# Patient Record
Sex: FEMALE | Race: WHITE | NOT HISPANIC OR LATINO | Employment: OTHER | URBAN - METROPOLITAN AREA
[De-identification: names, ages, dates, MRNs, and addresses within clinical notes are randomized per-mention and may not be internally consistent; named-entity substitution may affect disease eponyms.]

---

## 2019-02-25 ENCOUNTER — HISTORICAL (OUTPATIENT)
Dept: ADMINISTRATIVE | Facility: HOSPITAL | Age: 59
End: 2019-02-25

## 2019-03-03 LAB
FINAL CULTURE: NORMAL
FINAL CULTURE: NORMAL

## 2019-09-09 ENCOUNTER — HISTORICAL (OUTPATIENT)
Dept: ADMINISTRATIVE | Facility: HOSPITAL | Age: 59
End: 2019-09-09

## 2019-09-15 LAB — FINAL CULTURE: NORMAL

## 2022-11-14 ENCOUNTER — ANESTHESIA EVENT (OUTPATIENT)
Dept: CARDIOLOGY | Facility: HOSPITAL | Age: 62
End: 2022-11-14
Payer: MEDICARE

## 2022-11-15 ENCOUNTER — ANESTHESIA (OUTPATIENT)
Dept: CARDIOLOGY | Facility: HOSPITAL | Age: 62
End: 2022-11-15
Payer: MEDICARE

## 2022-11-15 ENCOUNTER — HOSPITAL ENCOUNTER (OUTPATIENT)
Dept: CARDIOLOGY | Facility: HOSPITAL | Age: 62
Discharge: HOME | End: 2022-11-15
Attending: INTERNAL MEDICINE
Payer: MEDICARE

## 2022-11-15 VITALS
HEIGHT: 68 IN | TEMPERATURE: 97 F | DIASTOLIC BLOOD PRESSURE: 70 MMHG | RESPIRATION RATE: 17 BRPM | WEIGHT: 293 LBS | SYSTOLIC BLOOD PRESSURE: 125 MMHG | OXYGEN SATURATION: 96 % | HEART RATE: 61 BPM | BODY MASS INDEX: 44.41 KG/M2

## 2022-11-15 DIAGNOSIS — I48.0 PAROXYSMAL ATRIAL FIBRILLATION: ICD-10-CM

## 2022-11-15 DIAGNOSIS — I48.0 PAROXYSMAL ATRIAL FIBRILLATION: Primary | ICD-10-CM

## 2022-11-15 DIAGNOSIS — I25.10 CAD (CORONARY ARTERY DISEASE): ICD-10-CM

## 2022-11-15 DIAGNOSIS — I48.19 PERSISTENT ATRIAL FIBRILLATION: ICD-10-CM

## 2022-11-15 LAB — BSA FOR ECHO PROCEDURE: 2.61 M2

## 2022-11-15 PROCEDURE — 93320 DOPPLER ECHO COMPLETE: CPT

## 2022-11-15 PROCEDURE — 93010 EKG 12-LEAD: ICD-10-PCS | Mod: ,,, | Performed by: INTERNAL MEDICINE

## 2022-11-15 PROCEDURE — 25000003 PHARM REV CODE 250: Performed by: NURSE ANESTHETIST, CERTIFIED REGISTERED

## 2022-11-15 PROCEDURE — 93005 ELECTROCARDIOGRAM TRACING: CPT | Mod: 59

## 2022-11-15 PROCEDURE — 37000009 HC ANESTHESIA EA ADD 15 MINS

## 2022-11-15 PROCEDURE — 93010 ELECTROCARDIOGRAM REPORT: CPT | Mod: ,,, | Performed by: INTERNAL MEDICINE

## 2022-11-15 PROCEDURE — 63600175 PHARM REV CODE 636 W HCPCS: Performed by: NURSE ANESTHETIST, CERTIFIED REGISTERED

## 2022-11-15 PROCEDURE — 37000008 HC ANESTHESIA 1ST 15 MINUTES

## 2022-11-15 RX ORDER — POTASSIUM CHLORIDE 1500 MG/1
20 TABLET, EXTENDED RELEASE ORAL 2 TIMES DAILY
COMMUNITY
Start: 2022-07-14

## 2022-11-15 RX ORDER — PROPOFOL 10 MG/ML
VIAL (ML) INTRAVENOUS
Status: DISCONTINUED | OUTPATIENT
Start: 2022-11-15 | End: 2022-11-15

## 2022-11-15 RX ORDER — FUROSEMIDE 80 MG/1
80 TABLET ORAL 2 TIMES DAILY PRN
COMMUNITY
Start: 2022-03-25

## 2022-11-15 RX ORDER — PAROXETINE HYDROCHLORIDE 20 MG/1
1 TABLET, FILM COATED ORAL DAILY
COMMUNITY
Start: 2022-03-25

## 2022-11-15 RX ORDER — ALBUTEROL SULFATE 0.83 MG/ML
2.5 SOLUTION RESPIRATORY (INHALATION) EVERY 4 HOURS PRN
COMMUNITY

## 2022-11-15 RX ORDER — LEVOTHYROXINE SODIUM 125 UG/1
125 TABLET ORAL DAILY
COMMUNITY
Start: 2022-09-28

## 2022-11-15 RX ORDER — FERROUS SULFATE 325(65) MG
325 TABLET, DELAYED RELEASE (ENTERIC COATED) ORAL DAILY
COMMUNITY
Start: 2022-04-07

## 2022-11-15 RX ORDER — HYDROCODONE BITARTRATE AND ACETAMINOPHEN 7.5; 325 MG/1; MG/1
1 TABLET ORAL 2 TIMES DAILY PRN
COMMUNITY
Start: 2022-08-02

## 2022-11-15 RX ORDER — LIDOCAINE HYDROCHLORIDE 20 MG/ML
INJECTION, SOLUTION EPIDURAL; INFILTRATION; INTRACAUDAL; PERINEURAL
Status: DISCONTINUED | OUTPATIENT
Start: 2022-11-15 | End: 2022-11-15

## 2022-11-15 RX ORDER — OMEPRAZOLE 40 MG/1
40 CAPSULE, DELAYED RELEASE ORAL
COMMUNITY
Start: 2022-07-15

## 2022-11-15 RX ADMIN — PROPOFOL 70 MG: 10 INJECTION, EMULSION INTRAVENOUS at 07:11

## 2022-11-15 RX ADMIN — PROPOFOL 50 MG: 10 INJECTION, EMULSION INTRAVENOUS at 07:11

## 2022-11-15 RX ADMIN — LIDOCAINE HYDROCHLORIDE 80 ML: 20 INJECTION, SOLUTION EPIDURAL; INFILTRATION; INTRACAUDAL; PERINEURAL at 07:11

## 2022-11-15 NOTE — ANESTHESIA POSTPROCEDURE EVALUATION
Anesthesia Post Evaluation    Patient: Azalea Marin    Procedure(s) Performed: * No procedures listed *    Final Anesthesia Type: general      Patient location during evaluation: PACU  Patient participation: Yes- Able to Participate  Level of consciousness: awake and alert  Post-procedure vital signs: reviewed and stable  Pain management: adequate  Airway patency: patent      Anesthetic complications: no      Cardiovascular status: hemodynamically stable  Respiratory status: unassisted  Hydration status: euvolemic  Follow-up not needed.          Vitals Value Taken Time   /74 11/15/22 0820   Temp 36 °C (96.8 °F) 11/15/22 0757   Pulse 75 11/15/22 0820   Resp 17 11/15/22 0820   SpO2 97 % 11/15/22 0820         No case tracking events are documented in the log.      Pain/Paul Score: No data recorded

## 2022-11-15 NOTE — DISCHARGE SUMMARY
[facility] - Cardiology Diagnostics  Discharge Note  Short Stay    Transesophageal echo (CHRIS)      OUTCOME: Patient tolerated treatment/procedure well without complication and is now ready for discharge.    DISPOSITION: Home or Self Care    FINAL DIAGNOSIS:  Persistent atrial fibrillation    FOLLOWUP: In clinic    DISCHARGE INSTRUCTIONS:    Discharge Procedure Orders   Diet Cardiac     Lifting restrictions   Order Comments: Do not raise arm above shoulder height and no more then 15 pounds.     Notify your health care provider if you experience any of the following:  temperature >100.4     Notify your health care provider if you experience any of the following:  redness, tenderness, or signs of infection (pain, swelling, redness, odor or green/yellow discharge around incision site)        TIME SPENT ON DISCHARGE: 15 minutes

## 2022-11-15 NOTE — TRANSFER OF CARE
"Anesthesia Transfer of Care Note    Patient: Azalea Marin    Procedure(s) Performed: * No procedures listed *    Patient location: PACU    Anesthesia Type: general    Transport from OR: Transported from OR on room air with adequate spontaneous ventilation    Post pain: adequate analgesia    Post assessment: no apparent anesthetic complications    Post vital signs: stable    Level of consciousness: awake    Nausea/Vomiting: no nausea/vomiting    Complications: none    Transfer of care protocol was followed      Last vitals:   Visit Vitals  /86 (BP Location: Left arm, Patient Position: Lying)   Pulse 68   Temp 36 °C (96.8 °F) (Skin)   Resp 14   Ht 5' 8" (1.727 m)   Wt (!) 141.5 kg (311 lb 15.2 oz)   SpO2 100%   Breastfeeding No   BMI 47.43 kg/m²     "

## 2022-11-15 NOTE — DISCHARGE INSTRUCTIONS
Follow up with Dr. Leyva 11/30/2022 @ 3:00pm [facility]

## 2022-12-08 ENCOUNTER — LAB VISIT (OUTPATIENT)
Dept: LAB | Facility: HOSPITAL | Age: 62
End: 2022-12-08
Attending: INTERNAL MEDICINE
Payer: MEDICARE

## 2022-12-08 DIAGNOSIS — Z00.6 EXAMINATION OF PARTICIPANT IN CLINICAL TRIAL: ICD-10-CM

## 2022-12-08 DIAGNOSIS — I48.20 CHRONIC ATRIAL FIBRILLATION: Primary | ICD-10-CM

## 2022-12-08 LAB
ABORH RETYPE: NORMAL
ALBUMIN SERPL-MCNC: 4.2 GM/DL (ref 3.4–4.8)
ALBUMIN/GLOB SERPL: 1.2 RATIO (ref 1.1–2)
ALP SERPL-CCNC: 209 UNIT/L (ref 40–150)
ALT SERPL-CCNC: 15 UNIT/L (ref 0–55)
AST SERPL-CCNC: 28 UNIT/L (ref 5–34)
BILIRUBIN DIRECT+TOT PNL SERPL-MCNC: 1.7 MG/DL
BUN SERPL-MCNC: 14.3 MG/DL (ref 9.8–20.1)
CALCIUM SERPL-MCNC: 10.3 MG/DL (ref 8.4–10.2)
CHLORIDE SERPL-SCNC: 71 MMOL/L (ref 98–107)
CO2 SERPL-SCNC: 47 MMOL/L (ref 23–31)
CREAT SERPL-MCNC: 0.73 MG/DL (ref 0.55–1.02)
ERYTHROCYTE [DISTWIDTH] IN BLOOD BY AUTOMATED COUNT: 13 % (ref 11.5–17)
GFR SERPLBLD CREATININE-BSD FMLA CKD-EPI: >60 MLS/MIN/1.73/M2
GLOBULIN SER-MCNC: 3.4 GM/DL (ref 2.4–3.5)
GLUCOSE SERPL-MCNC: 110 MG/DL (ref 82–115)
GROUP & RH: NORMAL
HCT VFR BLD AUTO: 36.2 % (ref 37–47)
HGB BLD-MCNC: 12 GM/DL (ref 12–16)
INDIRECT COOMBS GEL: NORMAL
INR BLD: 1.37 (ref 0–1.3)
MCH RBC QN AUTO: 34.6 PG (ref 27–31)
MCHC RBC AUTO-ENTMCNC: 33.1 MG/DL (ref 33–36)
MCV RBC AUTO: 104.3 FL (ref 80–94)
NRBC BLD AUTO-RTO: 0 %
PLATELET # BLD AUTO: 186 X10(3)/MCL (ref 130–400)
PMV BLD AUTO: 10 FL (ref 7.4–10.4)
POTASSIUM SERPL-SCNC: 3 MMOL/L (ref 3.5–5.1)
PROT SERPL-MCNC: 7.6 GM/DL (ref 5.8–7.6)
PROTHROMBIN TIME: 16.7 SECONDS (ref 12.5–14.5)
RBC # BLD AUTO: 3.47 X10(6)/MCL (ref 4.2–5.4)
SODIUM SERPL-SCNC: 132 MMOL/L (ref 136–145)
WBC # SPEC AUTO: 4.3 X10(3)/MCL (ref 4.5–11.5)

## 2022-12-08 PROCEDURE — 85610 PROTHROMBIN TIME: CPT

## 2022-12-08 PROCEDURE — 93005 ELECTROCARDIOGRAM TRACING: CPT

## 2022-12-08 PROCEDURE — 86901 BLOOD TYPING SEROLOGIC RH(D): CPT | Performed by: INTERNAL MEDICINE

## 2022-12-08 PROCEDURE — 93010 EKG 12-LEAD: ICD-10-PCS | Mod: ,,, | Performed by: INTERNAL MEDICINE

## 2022-12-08 PROCEDURE — 85027 COMPLETE CBC AUTOMATED: CPT

## 2022-12-08 PROCEDURE — 36415 COLL VENOUS BLD VENIPUNCTURE: CPT

## 2022-12-08 PROCEDURE — 80053 COMPREHEN METABOLIC PANEL: CPT

## 2022-12-08 PROCEDURE — 93010 ELECTROCARDIOGRAM REPORT: CPT | Mod: ,,, | Performed by: INTERNAL MEDICINE

## 2022-12-12 ENCOUNTER — ANESTHESIA EVENT (OUTPATIENT)
Dept: CARDIOLOGY | Facility: HOSPITAL | Age: 62
DRG: 274 | End: 2022-12-12
Payer: MEDICARE

## 2022-12-12 RX ORDER — ENOXAPARIN SODIUM 150 MG/ML
150 INJECTION SUBCUTANEOUS 2 TIMES DAILY
Status: ON HOLD | COMMUNITY
Start: 2022-11-30 | End: 2022-12-13

## 2022-12-13 ENCOUNTER — ANESTHESIA (OUTPATIENT)
Dept: CARDIOLOGY | Facility: HOSPITAL | Age: 62
DRG: 274 | End: 2022-12-13
Payer: MEDICARE

## 2022-12-13 ENCOUNTER — HOSPITAL ENCOUNTER (OUTPATIENT)
Dept: CARDIOLOGY | Facility: HOSPITAL | Age: 62
Discharge: HOME | DRG: 274 | End: 2022-12-13
Attending: INTERNAL MEDICINE
Payer: MEDICARE

## 2022-12-13 ENCOUNTER — HOSPITAL ENCOUNTER (INPATIENT)
Facility: HOSPITAL | Age: 62
LOS: 1 days | Discharge: HOME | DRG: 274 | End: 2022-12-14
Attending: INTERNAL MEDICINE | Admitting: INTERNAL MEDICINE
Payer: MEDICARE

## 2022-12-13 DIAGNOSIS — I48.20 CHRONIC ATRIAL FIBRILLATION: ICD-10-CM

## 2022-12-13 DIAGNOSIS — I48.20 CHRONIC ATRIAL FIBRILLATION: Primary | ICD-10-CM

## 2022-12-13 DIAGNOSIS — I48.91 ATRIAL FIBRILLATION: ICD-10-CM

## 2022-12-13 LAB
ANION GAP SERPL CALC-SCNC: 7 MEQ/L
BSA FOR ECHO PROCEDURE: 2.52 M2
BUN SERPL-MCNC: 9.3 MG/DL (ref 9.8–20.1)
CALCIUM SERPL-MCNC: 9.5 MG/DL (ref 8.4–10.2)
CHLORIDE SERPL-SCNC: 82 MMOL/L (ref 98–107)
CO2 SERPL-SCNC: 40 MMOL/L (ref 23–31)
CREAT SERPL-MCNC: 0.66 MG/DL (ref 0.55–1.02)
CREAT/UREA NIT SERPL: 14
GFR SERPLBLD CREATININE-BSD FMLA CKD-EPI: >60 MLS/MIN/1.73/M2
GLUCOSE SERPL-MCNC: 118 MG/DL (ref 82–115)
INR BLD: 1.05 (ref 0–1.3)
POCT GLUCOSE: 104 MG/DL (ref 70–110)
POTASSIUM SERPL-SCNC: 3.6 MMOL/L (ref 3.5–5.1)
PROTHROMBIN TIME: 13.6 SECONDS (ref 12.5–14.5)
SODIUM SERPL-SCNC: 129 MMOL/L (ref 136–145)

## 2022-12-13 PROCEDURE — C1889 IMPLANT/INSERT DEVICE, NOC: HCPCS | Performed by: INTERNAL MEDICINE

## 2022-12-13 PROCEDURE — 25000003 PHARM REV CODE 250

## 2022-12-13 PROCEDURE — 11000001 HC ACUTE MED/SURG PRIVATE ROOM

## 2022-12-13 PROCEDURE — 25000003 PHARM REV CODE 250: Performed by: INTERNAL MEDICINE

## 2022-12-13 PROCEDURE — 80048 BASIC METABOLIC PNL TOTAL CA: CPT | Performed by: INTERNAL MEDICINE

## 2022-12-13 PROCEDURE — 85610 PROTHROMBIN TIME: CPT | Performed by: INTERNAL MEDICINE

## 2022-12-13 PROCEDURE — 36415 COLL VENOUS BLD VENIPUNCTURE: CPT | Performed by: INTERNAL MEDICINE

## 2022-12-13 PROCEDURE — 37000008 HC ANESTHESIA 1ST 15 MINUTES: Performed by: INTERNAL MEDICINE

## 2022-12-13 PROCEDURE — 25500020 PHARM REV CODE 255: Performed by: INTERNAL MEDICINE

## 2022-12-13 PROCEDURE — 33340 PERQ CLSR TCAT L ATR APNDGE: CPT | Performed by: INTERNAL MEDICINE

## 2022-12-13 PROCEDURE — 93312 ECHO TRANSESOPHAGEAL: CPT

## 2022-12-13 PROCEDURE — 63600175 PHARM REV CODE 636 W HCPCS

## 2022-12-13 PROCEDURE — 63600175 PHARM REV CODE 636 W HCPCS: Performed by: ANESTHESIOLOGY

## 2022-12-13 PROCEDURE — 27201423 OPTIME MED/SURG SUP & DEVICES STERILE SUPPLY: Performed by: INTERNAL MEDICINE

## 2022-12-13 PROCEDURE — 93005 ELECTROCARDIOGRAM TRACING: CPT

## 2022-12-13 PROCEDURE — 93010 EKG 12-LEAD: ICD-10-PCS | Mod: ,,, | Performed by: INTERNAL MEDICINE

## 2022-12-13 PROCEDURE — C1894 INTRO/SHEATH, NON-LASER: HCPCS | Performed by: INTERNAL MEDICINE

## 2022-12-13 PROCEDURE — 63600175 PHARM REV CODE 636 W HCPCS: Performed by: NURSE PRACTITIONER

## 2022-12-13 PROCEDURE — 37000009 HC ANESTHESIA EA ADD 15 MINS: Performed by: INTERNAL MEDICINE

## 2022-12-13 PROCEDURE — 93010 ELECTROCARDIOGRAM REPORT: CPT | Mod: ,,, | Performed by: INTERNAL MEDICINE

## 2022-12-13 DEVICE — LEFT ATRIAL APPENDAGE CLOSURE DEVICE WITH DELIVERY SYSTEM
Type: IMPLANTABLE DEVICE | Site: HEART | Status: FUNCTIONAL
Brand: WATCHMAN FLX™

## 2022-12-13 RX ORDER — ALBUTEROL SULFATE 0.83 MG/ML
2.5 SOLUTION RESPIRATORY (INHALATION) EVERY 4 HOURS PRN
Status: DISCONTINUED | OUTPATIENT
Start: 2022-12-13 | End: 2022-12-14 | Stop reason: HOSPADM

## 2022-12-13 RX ORDER — PROPOFOL 10 MG/ML
VIAL (ML) INTRAVENOUS
Status: DISCONTINUED | OUTPATIENT
Start: 2022-12-13 | End: 2022-12-13

## 2022-12-13 RX ORDER — LIDOCAINE HYDROCHLORIDE 20 MG/ML
INJECTION, SOLUTION EPIDURAL; INFILTRATION; INTRACAUDAL; PERINEURAL
Status: DISCONTINUED | OUTPATIENT
Start: 2022-12-13 | End: 2022-12-13

## 2022-12-13 RX ORDER — FUROSEMIDE 40 MG/1
80 TABLET ORAL 2 TIMES DAILY PRN
Status: DISCONTINUED | OUTPATIENT
Start: 2022-12-13 | End: 2022-12-14 | Stop reason: HOSPADM

## 2022-12-13 RX ORDER — FENTANYL CITRATE 50 UG/ML
INJECTION, SOLUTION INTRAMUSCULAR; INTRAVENOUS
Status: DISCONTINUED | OUTPATIENT
Start: 2022-12-13 | End: 2022-12-13

## 2022-12-13 RX ORDER — POTASSIUM CHLORIDE 20 MEQ/1
20 TABLET, EXTENDED RELEASE ORAL 2 TIMES DAILY
Status: DISCONTINUED | OUTPATIENT
Start: 2022-12-13 | End: 2022-12-14 | Stop reason: HOSPADM

## 2022-12-13 RX ORDER — HYDROCODONE BITARTRATE AND ACETAMINOPHEN 7.5; 325 MG/1; MG/1
1 TABLET ORAL EVERY 6 HOURS PRN
Status: DISCONTINUED | OUTPATIENT
Start: 2022-12-13 | End: 2022-12-14 | Stop reason: HOSPADM

## 2022-12-13 RX ORDER — DOXYCYCLINE HYCLATE 100 MG
100 TABLET ORAL 2 TIMES DAILY
Status: DISCONTINUED | OUTPATIENT
Start: 2022-12-13 | End: 2022-12-14 | Stop reason: HOSPADM

## 2022-12-13 RX ORDER — CEFAZOLIN SODIUM 1 G/3ML
INJECTION, POWDER, FOR SOLUTION INTRAMUSCULAR; INTRAVENOUS
Status: DISCONTINUED | OUTPATIENT
Start: 2022-12-13 | End: 2022-12-13

## 2022-12-13 RX ORDER — CEFAZOLIN SODIUM 2 G/50ML
2 SOLUTION INTRAVENOUS ONCE
Status: DISCONTINUED | OUTPATIENT
Start: 2022-12-13 | End: 2022-12-14 | Stop reason: HOSPADM

## 2022-12-13 RX ORDER — HYDROCODONE BITARTRATE AND ACETAMINOPHEN 5; 325 MG/1; MG/1
1 TABLET ORAL EVERY 4 HOURS PRN
Status: DISCONTINUED | OUTPATIENT
Start: 2022-12-13 | End: 2022-12-14 | Stop reason: HOSPADM

## 2022-12-13 RX ORDER — HYDROMORPHONE HYDROCHLORIDE 2 MG/ML
0.2 INJECTION, SOLUTION INTRAMUSCULAR; INTRAVENOUS; SUBCUTANEOUS EVERY 5 MIN PRN
Status: DISCONTINUED | OUTPATIENT
Start: 2022-12-13 | End: 2022-12-14 | Stop reason: HOSPADM

## 2022-12-13 RX ORDER — ONDANSETRON 2 MG/ML
INJECTION INTRAMUSCULAR; INTRAVENOUS
Status: DISCONTINUED | OUTPATIENT
Start: 2022-12-13 | End: 2022-12-13

## 2022-12-13 RX ORDER — DEXAMETHASONE SODIUM PHOSPHATE 4 MG/ML
INJECTION, SOLUTION INTRA-ARTICULAR; INTRALESIONAL; INTRAMUSCULAR; INTRAVENOUS; SOFT TISSUE
Status: DISCONTINUED | OUTPATIENT
Start: 2022-12-13 | End: 2022-12-13

## 2022-12-13 RX ORDER — HYDROMORPHONE HYDROCHLORIDE 2 MG/ML
0.5 INJECTION, SOLUTION INTRAMUSCULAR; INTRAVENOUS; SUBCUTANEOUS EVERY 5 MIN PRN
Status: DISCONTINUED | OUTPATIENT
Start: 2022-12-13 | End: 2022-12-14 | Stop reason: HOSPADM

## 2022-12-13 RX ORDER — SUCCINYLCHOLINE CHLORIDE 20 MG/ML
INJECTION INTRAMUSCULAR; INTRAVENOUS
Status: DISCONTINUED | OUTPATIENT
Start: 2022-12-13 | End: 2022-12-13

## 2022-12-13 RX ORDER — LIDOCAINE HYDROCHLORIDE 10 MG/ML
INJECTION, SOLUTION EPIDURAL; INFILTRATION; INTRACAUDAL; PERINEURAL
Status: DISCONTINUED | OUTPATIENT
Start: 2022-12-13 | End: 2022-12-13

## 2022-12-13 RX ORDER — MEPERIDINE HYDROCHLORIDE 25 MG/ML
12.5 INJECTION INTRAMUSCULAR; INTRAVENOUS; SUBCUTANEOUS ONCE
Status: DISCONTINUED | OUTPATIENT
Start: 2022-12-13 | End: 2022-12-14 | Stop reason: HOSPADM

## 2022-12-13 RX ORDER — MUPIROCIN 20 MG/G
OINTMENT TOPICAL DAILY
Status: DISCONTINUED | OUTPATIENT
Start: 2022-12-13 | End: 2022-12-13

## 2022-12-13 RX ORDER — ONDANSETRON 2 MG/ML
4 INJECTION INTRAMUSCULAR; INTRAVENOUS EVERY 8 HOURS PRN
Status: DISCONTINUED | OUTPATIENT
Start: 2022-12-13 | End: 2022-12-14 | Stop reason: HOSPADM

## 2022-12-13 RX ORDER — HEPARIN SODIUM 1000 [USP'U]/ML
INJECTION, SOLUTION INTRAVENOUS; SUBCUTANEOUS CONTINUOUS PRN
Status: DISCONTINUED | OUTPATIENT
Start: 2022-12-13 | End: 2022-12-13

## 2022-12-13 RX ORDER — DIPHENHYDRAMINE HYDROCHLORIDE 50 MG/ML
25 INJECTION INTRAMUSCULAR; INTRAVENOUS EVERY 6 HOURS PRN
Status: DISCONTINUED | OUTPATIENT
Start: 2022-12-13 | End: 2022-12-14 | Stop reason: HOSPADM

## 2022-12-13 RX ORDER — ONDANSETRON 2 MG/ML
4 INJECTION INTRAMUSCULAR; INTRAVENOUS ONCE
Status: DISCONTINUED | OUTPATIENT
Start: 2022-12-13 | End: 2022-12-14 | Stop reason: HOSPADM

## 2022-12-13 RX ORDER — PANTOPRAZOLE SODIUM 40 MG/1
40 TABLET, DELAYED RELEASE ORAL DAILY
Status: DISCONTINUED | OUTPATIENT
Start: 2022-12-13 | End: 2022-12-14 | Stop reason: HOSPADM

## 2022-12-13 RX ORDER — PAROXETINE HYDROCHLORIDE 20 MG/1
20 TABLET, FILM COATED ORAL DAILY
Status: DISCONTINUED | OUTPATIENT
Start: 2022-12-13 | End: 2022-12-14 | Stop reason: HOSPADM

## 2022-12-13 RX ORDER — ROCURONIUM BROMIDE 10 MG/ML
INJECTION, SOLUTION INTRAVENOUS
Status: DISCONTINUED | OUTPATIENT
Start: 2022-12-13 | End: 2022-12-13

## 2022-12-13 RX ORDER — LEVOTHYROXINE SODIUM 125 UG/1
125 TABLET ORAL DAILY
Status: DISCONTINUED | OUTPATIENT
Start: 2022-12-13 | End: 2022-12-14 | Stop reason: HOSPADM

## 2022-12-13 RX ORDER — HEPARIN SODIUM 1000 [USP'U]/ML
INJECTION, SOLUTION INTRAVENOUS; SUBCUTANEOUS
Status: DISCONTINUED | OUTPATIENT
Start: 2022-12-13 | End: 2022-12-13

## 2022-12-13 RX ORDER — METOPROLOL SUCCINATE 25 MG/1
25 TABLET, EXTENDED RELEASE ORAL DAILY
Status: DISCONTINUED | OUTPATIENT
Start: 2022-12-13 | End: 2022-12-14 | Stop reason: HOSPADM

## 2022-12-13 RX ADMIN — ONDANSETRON 4 MG: 2 INJECTION INTRAMUSCULAR; INTRAVENOUS at 03:12

## 2022-12-13 RX ADMIN — FENTANYL CITRATE 50 MCG: 50 INJECTION, SOLUTION INTRAMUSCULAR; INTRAVENOUS at 09:12

## 2022-12-13 RX ADMIN — SUGAMMADEX 200 MG: 100 INJECTION, SOLUTION INTRAVENOUS at 10:12

## 2022-12-13 RX ADMIN — PROPOFOL 140 MG: 10 INJECTION, EMULSION INTRAVENOUS at 09:12

## 2022-12-13 RX ADMIN — ROCURONIUM BROMIDE 5 MG: 10 INJECTION, SOLUTION INTRAVENOUS at 09:12

## 2022-12-13 RX ADMIN — ROCURONIUM BROMIDE 45 MG: 10 INJECTION, SOLUTION INTRAVENOUS at 10:12

## 2022-12-13 RX ADMIN — LIDOCAINE HYDROCHLORIDE 80 MG: 20 INJECTION, SOLUTION EPIDURAL; INFILTRATION; INTRACAUDAL; PERINEURAL at 09:12

## 2022-12-13 RX ADMIN — HYDROMORPHONE HYDROCHLORIDE 0.5 MG: 2 INJECTION INTRAMUSCULAR; INTRAVENOUS; SUBCUTANEOUS at 11:12

## 2022-12-13 RX ADMIN — POTASSIUM CHLORIDE 20 MEQ: 20 TABLET, EXTENDED RELEASE ORAL at 06:12

## 2022-12-13 RX ADMIN — HEPARIN SODIUM 6000 UNITS: 1000 INJECTION, SOLUTION INTRAVENOUS; SUBCUTANEOUS at 10:12

## 2022-12-13 RX ADMIN — ROCURONIUM BROMIDE 20 MG: 10 INJECTION, SOLUTION INTRAVENOUS at 10:12

## 2022-12-13 RX ADMIN — PROPOFOL 30 MG: 10 INJECTION, EMULSION INTRAVENOUS at 10:12

## 2022-12-13 RX ADMIN — SODIUM CHLORIDE, SODIUM GLUCONATE, SODIUM ACETATE, POTASSIUM CHLORIDE AND MAGNESIUM CHLORIDE: 526; 502; 368; 37; 30 INJECTION, SOLUTION INTRAVENOUS at 09:12

## 2022-12-13 RX ADMIN — PANTOPRAZOLE SODIUM 40 MG: 40 TABLET, DELAYED RELEASE ORAL at 06:12

## 2022-12-13 RX ADMIN — DEXAMETHASONE SODIUM PHOSPHATE 4 MG: 4 INJECTION, SOLUTION INTRA-ARTICULAR; INTRALESIONAL; INTRAMUSCULAR; INTRAVENOUS; SOFT TISSUE at 09:12

## 2022-12-13 RX ADMIN — SUCCINYLCHOLINE CHLORIDE 200 MG: 20 INJECTION, SOLUTION INTRAMUSCULAR; INTRAVENOUS at 09:12

## 2022-12-13 RX ADMIN — DOXYCYCLINE HYCLATE 100 MG: 100 TABLET, COATED ORAL at 09:12

## 2022-12-13 RX ADMIN — ONDANSETRON 4 MG: 2 INJECTION INTRAMUSCULAR; INTRAVENOUS at 10:12

## 2022-12-13 RX ADMIN — HEPARIN SODIUM 7000 UNITS: 1000 INJECTION, SOLUTION INTRAVENOUS; SUBCUTANEOUS at 10:12

## 2022-12-13 RX ADMIN — MUPIROCIN: 20 OINTMENT TOPICAL at 09:12

## 2022-12-13 RX ADMIN — HEPARIN SODIUM 1000 UNITS/HR: 1000 INJECTION, SOLUTION INTRAVENOUS; SUBCUTANEOUS at 10:12

## 2022-12-13 RX ADMIN — HYDROMORPHONE HYDROCHLORIDE 0.5 MG: 2 INJECTION INTRAMUSCULAR; INTRAVENOUS; SUBCUTANEOUS at 12:12

## 2022-12-13 RX ADMIN — CEFAZOLIN 2 G: 330 INJECTION, POWDER, FOR SOLUTION INTRAMUSCULAR; INTRAVENOUS at 09:12

## 2022-12-13 RX ADMIN — FENTANYL CITRATE 50 MCG: 50 INJECTION, SOLUTION INTRAMUSCULAR; INTRAVENOUS at 10:12

## 2022-12-13 RX ADMIN — HYDROMORPHONE HYDROCHLORIDE 0.5 MG: 2 INJECTION INTRAMUSCULAR; INTRAVENOUS; SUBCUTANEOUS at 01:12

## 2022-12-13 NOTE — TRANSFER OF CARE
"Anesthesia Transfer of Care Note    Patient: Azalea Marin    Procedure(s) Performed: Procedure(s) (LRB):  CLOSURE, LEFT ATRIAL APPENDAGE, USING DEVICE (N/A)  ECHOCARDIOGRAM,TRANSESOPHAGEAL (N/A)    Patient location: PACU    Anesthesia Type: general    Transport from OR: Transported from OR on 2-3 L/min O2 by NC with adequate spontaneous ventilation    Post pain: adequate analgesia    Post assessment: no apparent anesthetic complications    Post vital signs: stable    Level of consciousness: responds to stimulation    Nausea/Vomiting: no nausea/vomiting    Complications: none    Transfer of care protocol was followedComments: Detailed report with handoff to licensed provider complete      Last vitals:   Visit Vitals  /72   Pulse 69   Temp 36.9 °C (98.4 °F)   Resp 18   Ht 5' 8" (1.727 m)   Wt 132.4 kg (291 lb 14.2 oz)   SpO2 99%   Breastfeeding No   BMI 44.38 kg/m²     "

## 2022-12-13 NOTE — ANESTHESIA PROCEDURE NOTES
Arterial    Diagnosis: IABP monitoring and/or frequent blood draws    Patient location during procedure: done in OR    Staffing  Authorizing Provider: Kristin Foley MD  Performing Provider: Kristin Foley MD    Anesthesiologist was present at the time of the procedure.    Preanesthetic Checklist  Completed: patient identified, IV checked, site marked, risks and benefits discussed, surgical consent, monitors and equipment checked, pre-op evaluation, timeout performed and anesthesia consent givenArterial  Skin Prep: chlorhexidine gluconate  Local Infiltration: lidocaine  Orientation: right  Location: radial    Catheter Size: 20 G  Catheter placement by Anatomical landmarks. Heme positive aspiration all ports. Insertion Attempts: 1  Assessment  Dressing: secured with tape and tegaderm  Patient: Tolerated well

## 2022-12-13 NOTE — Clinical Note
A dressing was applied to the right femoral vein puncture site. Sterile dressing and tegaderm applied.

## 2022-12-13 NOTE — ANESTHESIA PREPROCEDURE EVALUATION
12/13/2022  Azalea Marin is a 62 y.o., female.    EF 55-60%, PFO, mod MR/TR    Pre-op Assessment    I have reviewed the Patient Summary Reports.     I have reviewed the Nursing Notes. I have reviewed the NPO Status.   I have reviewed the Medications.     Review of Systems  Social:  Former Smoker    Cardiovascular:   Hypertension CHF NYHA Classification II    Pulmonary:   COPD Sleep Apnea    Hepatic/GI:   GERD    Endocrine:  Morbid Obesity / BMI > 40  Psych:   Psychiatric History          Physical Exam  General: Well nourished, Cooperative, Alert and Oriented    Airway:  Mallampati: II   Mouth Opening: Normal  TM Distance: Normal  Tongue: Normal  Neck ROM: Normal ROM    Dental:Upper edentulous      Anesthesia Plan  Type of Anesthesia, risks & benefits discussed:    Anesthesia Type: Gen ETT  Intra-op Monitoring Plan: Standard ASA Monitors and Art Line  Post Op Pain Control Plan: multimodal analgesia and IV/PO Opioids PRN  Airway Plan: Direct, Post-Induction  Informed Consent: Informed consent signed with the Patient and all parties understand the risks and agree with anesthesia plan.  All questions answered. Patient consented to blood products? Yes  ASA Score: 3  Day of Surgery Review of History & Physical: H&P Update referred to the surgeon/provider.    Ready For Surgery From Anesthesia Perspective.     .

## 2022-12-13 NOTE — ANESTHESIA POSTPROCEDURE EVALUATION
Anesthesia Post Evaluation    Patient: Azalea Marin    Procedure(s) Performed: Procedure(s) (LRB):  CLOSURE, LEFT ATRIAL APPENDAGE, USING DEVICE (N/A)  ECHOCARDIOGRAM,TRANSESOPHAGEAL (N/A)    Final Anesthesia Type: general      Patient location during evaluation: PACU  Patient participation: Yes- Able to Participate  Level of consciousness: awake and alert  Post-procedure vital signs: reviewed and stable  Pain management: adequate  Airway patency: patent      Anesthetic complications: no      Cardiovascular status: hemodynamically stable  Respiratory status: unassisted  Hydration status: euvolemic  Follow-up not needed.          Vitals Value Taken Time   /76 12/13/22 1330   Temp 36.9 °C (98.5 °F) 12/13/22 1112   Pulse 70 12/13/22 1339   Resp 18 12/13/22 1339   SpO2 96 % 12/13/22 1339   Vitals shown include unvalidated device data.      No case tracking events are documented in the log.      Pain/Paul Score: Pain Rating Prior to Med Admin: 7 (12/13/2022  1:00 PM)  Pain Rating Post Med Admin: 2 (12/13/2022 11:40 AM)  Paul Score: 9 (12/13/2022 12:30 PM)

## 2022-12-13 NOTE — Clinical Note
A venogram was performed in the BRE. The vessel was injected via hand injection  with 20 mL of contrast. Venogram of BRE for sizing and morphology.

## 2022-12-13 NOTE — Clinical Note
Watchman device deployed under flouroscopy with CHRIS guidance and Watchman Rep verification of placement.

## 2022-12-13 NOTE — NURSING
Nurses Note -- 4 Eyes      12/13/2022   2:23 PM      Skin assessed during: Transfer      [] No Pressure Injuries Present    []Prevention Measures Documented      [x] Yes- Altered Skin Integrity Present or Discovered   [x] LDA Added if Not in Epic (Describe Wound)   [x] New Altered Skin Integrity was Present on Admit and Documented in LDA   [x] Wound Image Taken    Wound Care Consulted? Yes    Attending Nurse:  Rhona Machuca RN     Second RN/Staff Member: Gila Ferrell CNA

## 2022-12-13 NOTE — ANESTHESIA PROCEDURE NOTES
Intubation    Date/Time: 12/13/2022 9:53 AM  Performed by: Nabil Chauhan  Authorized by: Kristin Foley MD     Intubation:     Induction:  Intravenous    Intubated:  Postinduction    Mask Ventilation:  Easy mask    Attempts:  1    Attempted By:  Student    Method of Intubation:  Direct    Blade:  Jacinto 4    Laryngeal View Grade: Grade I - full view of cords      Difficult Airway Encountered?: No      Complications:  None    Airway Device:  Oral endotracheal tube    Airway Device Size:  7.5    Style/Cuff Inflation:  Cuffed (inflated to minimal occlusive pressure)    Tube secured:  23    Secured at:  The teeth    Placement Verified By:  Capnometry    Complicating Factors:  None    Findings Post-Intubation:  BS equal bilateral and atraumatic/condition of teeth unchanged

## 2022-12-14 VITALS
SYSTOLIC BLOOD PRESSURE: 101 MMHG | HEIGHT: 68 IN | BODY MASS INDEX: 44.23 KG/M2 | DIASTOLIC BLOOD PRESSURE: 62 MMHG | WEIGHT: 291.88 LBS | TEMPERATURE: 98 F | HEART RATE: 73 BPM | RESPIRATION RATE: 18 BRPM | OXYGEN SATURATION: 100 %

## 2022-12-14 LAB
ANION GAP SERPL CALC-SCNC: 8 MEQ/L
AV INDEX (PROSTH): 0.82
AV MEAN GRADIENT: 6 MMHG
AV PEAK GRADIENT: 11 MMHG
AV VALVE AREA: 2.59 CM2
AV VELOCITY RATIO: 0.67
BASOPHILS # BLD AUTO: 0.02 X10(3)/MCL (ref 0–0.2)
BASOPHILS NFR BLD AUTO: 0.4 %
BSA FOR ECHO PROCEDURE: 2.52 M2
BUN SERPL-MCNC: 6.8 MG/DL (ref 9.8–20.1)
CALCIUM SERPL-MCNC: 9.5 MG/DL (ref 8.4–10.2)
CHLORIDE SERPL-SCNC: 86 MMOL/L (ref 98–107)
CO2 SERPL-SCNC: 37 MMOL/L (ref 23–31)
CREAT SERPL-MCNC: 0.63 MG/DL (ref 0.55–1.02)
CREAT/UREA NIT SERPL: 11
DOP CALC AO PEAK VEL: 1.65 M/S
DOP CALC AO VTI: 27.8 CM
DOP CALC LVOT AREA: 3.1 CM2
DOP CALC LVOT DIAMETER: 2 CM
DOP CALC LVOT PEAK VEL: 1.1 M/S
DOP CALC LVOT STROKE VOLUME: 71.91 CM3
DOP CALCLVOT PEAK VEL VTI: 22.9 CM
ECHO LV POSTERIOR WALL: 1.14 CM (ref 0.6–1.1)
EOSINOPHIL # BLD AUTO: 0.01 X10(3)/MCL (ref 0–0.9)
EOSINOPHIL NFR BLD AUTO: 0.2 %
ERYTHROCYTE [DISTWIDTH] IN BLOOD BY AUTOMATED COUNT: 12.2 % (ref 11.5–17)
GFR SERPLBLD CREATININE-BSD FMLA CKD-EPI: >60 MLS/MIN/1.73/M2
GLUCOSE SERPL-MCNC: 116 MG/DL (ref 82–115)
HCT VFR BLD AUTO: 33.7 % (ref 37–47)
HGB BLD-MCNC: 10.9 GM/DL (ref 12–16)
IMM GRANULOCYTES # BLD AUTO: 0.02 X10(3)/MCL (ref 0–0.04)
IMM GRANULOCYTES NFR BLD AUTO: 0.4 %
INTERVENTRICULAR SEPTUM: 1.03 CM (ref 0.6–1.1)
LEFT ATRIUM SIZE: 6.4 CM
LEFT INTERNAL DIMENSION IN SYSTOLE: 4.59 CM (ref 2.1–4)
LEFT VENTRICLE DIASTOLIC VOLUME INDEX: 75.83 ML/M2
LEFT VENTRICLE DIASTOLIC VOLUME: 182 ML
LEFT VENTRICLE SYSTOLIC VOLUME INDEX: 40.3 ML/M2
LEFT VENTRICLE SYSTOLIC VOLUME: 96.8 ML
LVOT MG: 2 MMHG
LVOT MV: 0.72 CM/S
LYMPHOCYTES # BLD AUTO: 1.01 X10(3)/MCL (ref 0.6–4.6)
LYMPHOCYTES NFR BLD AUTO: 20.9 %
MACROCYTES BLD QL SMEAR: ABNORMAL
MCH RBC QN AUTO: 34.4 PG (ref 27–31)
MCHC RBC AUTO-ENTMCNC: 32.3 MG/DL (ref 33–36)
MCV RBC AUTO: 106.3 FL (ref 80–94)
MONOCYTES # BLD AUTO: 0.71 X10(3)/MCL (ref 0.1–1.3)
MONOCYTES NFR BLD AUTO: 14.7 %
NEUTROPHILS # BLD AUTO: 3.1 X10(3)/MCL (ref 2.1–9.2)
NEUTROPHILS NFR BLD AUTO: 63.4 %
NRBC BLD AUTO-RTO: 0 %
PISA TR MAX VEL: 4.49 M/S
PLATELET # BLD AUTO: 146 X10(3)/MCL (ref 130–400)
PLATELET # BLD EST: ADEQUATE 10*3/UL
PMV BLD AUTO: 10.1 FL (ref 7.4–10.4)
POIKILOCYTOSIS BLD QL SMEAR: ABNORMAL
POTASSIUM SERPL-SCNC: 4.1 MMOL/L (ref 3.5–5.1)
RBC # BLD AUTO: 3.17 X10(6)/MCL (ref 4.2–5.4)
RBC MORPH BLD: ABNORMAL
RIGHT VENTRICULAR END-DIASTOLIC DIMENSION: 4.58 CM
SODIUM SERPL-SCNC: 131 MMOL/L (ref 136–145)
TARGETS BLD QL SMEAR: ABNORMAL
TR MAX PG: 81 MMHG
WBC # SPEC AUTO: 4.8 X10(3)/MCL (ref 4.5–11.5)

## 2022-12-14 PROCEDURE — 85025 COMPLETE CBC W/AUTO DIFF WBC: CPT | Performed by: INTERNAL MEDICINE

## 2022-12-14 PROCEDURE — 36415 COLL VENOUS BLD VENIPUNCTURE: CPT | Performed by: INTERNAL MEDICINE

## 2022-12-14 PROCEDURE — 80048 BASIC METABOLIC PNL TOTAL CA: CPT | Performed by: INTERNAL MEDICINE

## 2022-12-14 PROCEDURE — 25000003 PHARM REV CODE 250: Performed by: INTERNAL MEDICINE

## 2022-12-14 PROCEDURE — 25000003 PHARM REV CODE 250: Performed by: NURSE PRACTITIONER

## 2022-12-14 RX ADMIN — PANTOPRAZOLE SODIUM 40 MG: 40 TABLET, DELAYED RELEASE ORAL at 08:12

## 2022-12-14 RX ADMIN — DOXYCYCLINE HYCLATE 100 MG: 100 TABLET, COATED ORAL at 08:12

## 2022-12-14 RX ADMIN — POTASSIUM CHLORIDE 20 MEQ: 20 TABLET, EXTENDED RELEASE ORAL at 08:12

## 2022-12-14 RX ADMIN — METOPROLOL SUCCINATE 25 MG: 25 TABLET, FILM COATED, EXTENDED RELEASE ORAL at 08:12

## 2022-12-14 RX ADMIN — APIXABAN 5 MG: 5 TABLET, FILM COATED ORAL at 08:12

## 2022-12-14 RX ADMIN — LEVOTHYROXINE SODIUM 125 MCG: 125 TABLET ORAL at 08:12

## 2022-12-14 RX ADMIN — PAROXETINE HYDROCHLORIDE 20 MG: 20 TABLET, FILM COATED ORAL at 08:12

## 2022-12-14 NOTE — PLAN OF CARE
Pt states she lives with her SO Hector Franco, states she is indep in adl's; Has a w/c to use prn. She states she is current with [facility] for treatment of cellulitis in lower leg and monitoring fluid retention /weight. I will send dc orders to [facility] via [facility].   PCP: Abbi Jacobsen; Rx: [facility] in [place] and [facility] in [place]  Pt states her sister will drive her home today and pt denies any dc needs at this time.  No IMM in Epic, email sent to appro person.

## 2022-12-14 NOTE — DISCHARGE SUMMARY
[facility] - [facility]  Cardiology  Discharge Summary    Patient Name: Azalea Marin  MRN: [id number]  Admission Date: 12/13/2022  Hospital Length of Stay: 1 days  Code Status: No Order   Attending Physician: Ronny Leyva MD   Primary Care Physician: Abbi Jacobsen MD  Expected Discharge Date: 12/14/2022  Principal Problem:Persistent atrial fibrillation    Subjective:     Hospital Course:   This is a 62-year-old female, who is known to Dr. Leyva and Dr. Grewal, with history of chronic AFib, former tobacco use, HTN, HLD, pulmonary hypertension/home O2, DESIRAE, VHD, GERD, COPD, obesity, GI bleeds.  She presented to [facility] on 12/13/2022 for left atrial appendage occlusion device.  Patient underwent successful Watchman.  Right groin benign.  Follow-up with Dr. Leyva on 12/21/2022 at 10:30 a.m. at the [facility] clinic.  Patient is stable for discharge.    Review of Systems   Constitutional: Negative for chills and fever.   Cardiovascular:  Negative for chest pain and palpitations.   Respiratory:  Negative for shortness of breath.    Psychiatric/Behavioral:  Negative for altered mental status.          Objective:     Vital Signs (Most Recent):  Temp: 97.8 °F (36.6 °C) (12/14/22 0754)  Pulse: 73 (12/14/22 0754)  Resp: (!) 21 (12/13/22 1938)  BP: 101/62 (12/14/22 0754)  SpO2: 100 % (12/14/22 0754)   Vital Signs (24h Range):  Temp:  [97.5 °F (36.4 °C)-98.6 °F (37 °C)] 97.8 °F (36.6 °C)  Pulse:  [60-99] 73  Resp:  [14-21] 21  SpO2:  [93 %-100 %] 100 %  BP: ()/() 101/62     Weight: 132.4 kg (291 lb 14.2 oz)  Body mass index is 44.38 kg/m².    SpO2: 100 %       No intake or output data in the 24 hours ending 12/14/22 1042      Lines/Drains/Airways       Drain  Duration                  Sheath 12/13/22 1008 Right anterior;proximal 1 day              Peripheral Intravenous Line  Duration                  Peripheral IV - Single Lumen 12/13/22 0740 20 G Right Antecubital 1 day                    Significant  Labs:   Recent Lab Results         12/14/22  0646   12/13/22  1045   12/13/22  0801   12/13/22  0748        ANION GAP 8.0       7.0       Baso # 0.02             Basophil % 0.4             BSA   2.52           BUN 6.8       9.3       BUN/CREAT RATIO 11       14       Calcium 9.5       9.5       Chloride 86       82       CO2 37       40       Creatinine 0.63       0.66       eGFR >60       >60       Eos # 0.01             Eosinophil % 0.2             Glucose 116       118       HEMATOCRIT 33.7             HEMOGLOBIN 10.9             Immature Grans (Abs) 0.02             Immature Granulocytes 0.4             INR       1.05       Lymph # 1.01             LYMPH % 20.9             Macrocyte 1+             MCH 34.4             MCHC 32.3             .3             Mono # 0.71             Mono % 14.7             MPV 10.1             Neut # 3.1             Neut % 63.4             nRBC 0.0             Platelet Estimate Adequate             Platelets 146             POCT Glucose     104         Poikilocytosis 1+             Potassium 4.1       3.6       Protime       13.6       RBC 3.17             RBC Morph Abnormal             RDW 12.2             Sodium 131       129       Target Cells 1+             WBC 4.8                     Telemetry:  Afib    Physical Exam:  Physical Exam  Constitutional:       Appearance: Normal appearance.   HENT:      Head: Atraumatic.   Eyes:      Extraocular Movements: Extraocular movements intact.      Conjunctiva/sclera: Conjunctivae normal.   Cardiovascular:      Rate and Rhythm: Normal rate. Rhythm irregular.      Pulses: Normal pulses.      Heart sounds: Normal heart sounds.      Comments: R Groin Soft/Flat, Non-Tender, No Sign of Bleed/Infection. +2 BLE Palpable Pedal Pulses    Pulmonary:      Effort: Pulmonary effort is normal.      Breath sounds: Normal breath sounds.   Abdominal:      Palpations: Abdomen is soft.   Musculoskeletal:         General: Normal range of motion.       Cervical back: Neck supple.   Skin:     General: Skin is warm and dry.   Neurological:      Mental Status: She is alert and oriented to person, place, and time.   Psychiatric:         Mood and Affect: Mood normal.         Behavior: Behavior normal.       Current Inpatient Medications:    Current Facility-Administered Medications:     albuterol nebulizer solution 2.5 mg, 2.5 mg, Nebulization, Q4H PRN, Ronny Leyva MD    apixaban tablet 5 mg, 5 mg, Oral, BID, Nick Rodriguez AGACNP-BC, 5 mg at 12/14/22 0858    cefazolin (ANCEF) 2 gram in dextrose 5% 50 mL IVPB (premix), 2 g, Intravenous, Once, Ronyn Leyva MD    diphenhydrAMINE injection 25 mg, 25 mg, Intravenous, Q6H PRN, Kristin Foley MD    doxycycline tablet 100 mg, 100 mg, Oral, BID, Ronny Leyva MD, 100 mg at 12/14/22 0858    furosemide tablet 80 mg, 80 mg, Oral, BID PRN, Ronny Leyva MD    HYDROcodone-acetaminophen 5-325 mg per tablet 1 tablet, 1 tablet, Oral, Q4H PRN, Ronny Leyva MD    HYDROcodone-acetaminophen 7.5-325 mg per tablet 1 tablet, 1 tablet, Oral, Q6H PRN, Ronny Leyva MD    HYDROmorphone (PF) injection 0.2 mg, 0.2 mg, Intravenous, Q5 Min PRN, Kristin Foley MD    HYDROmorphone (PF) injection 0.5 mg, 0.5 mg, Intravenous, Q5 Min PRN, Kristin Foley MD, 0.5 mg at 12/13/22 1300    iopamidoL (ISOVUE-370) injection, , , PRN, Ronny Leyva MD, 20 mL at 12/13/22 1046    levothyroxine tablet 125 mcg, 125 mcg, Oral, Daily, Ronny Leyva MD, 125 mcg at 12/14/22 0858    meperidine (PF) injection 12.5 mg, 12.5 mg, Intravenous, Once, Kristin Foley MD    metoprolol succinate (TOPROL-XL) 24 hr tablet 25 mg, 25 mg, Oral, Daily, Ronny Leyva MD, 25 mg at 12/14/22 0857    ondansetron injection 4 mg, 4 mg, Intravenous, Once, Kristin Foley MD    ondansetron injection 4 mg, 4 mg, Intravenous, Q8H PRN, ELMER Bloom, 4 mg at 12/13/22 1526    pantoprazole EC tablet 40 mg, 40 mg, Oral, Daily, Ronny Leyva MD, 40 mg at 12/14/22 0858    paroxetine tablet 20 mg, 20 mg,  Oral, Daily, Ronny Leyva MD, 20 mg at 12/14/22 0857    potassium chloride SA CR tablet 20 mEq, 20 mEq, Oral, BID, Ronny Leyva MD, 20 mEq at 12/14/22 0858        Assessment:     IMPRESSION:  Chronic Afib/Watchman device  Hx of GI Bleeds  HTN  HLD  Pulmonary HTN/home O2  DESIRAE  VHD  GERD  COPD  Former smoker  Obesity      Plan:     Plan:  DC home today  Right groin precautions/activity restrictions  Continue Eliquis for now, will be de-escalated in clinic after CHRIS to ensure occlusion of BRE  Echo reviewed by Dr. Addison. Final report pending  F/U with Dr. Leyva on 12.21.22 @ 1030    DISCHARGE PLAN:  Discharge: Home  Discharge Diet: Cardiac, Low Sodium  Discharge Condition: Stable  Discharge Activity:  As Tolerated, No Heavy Lifting > 5 lbs., Notify MD with Symptoms of Bleed/Infection  Discharge Time: >30minutes    Discharge Medications:     Medication List        CONTINUE taking these medications      albuterol 2.5 mg /3 mL (0.083 %) nebulizer solution  Commonly known as: PROVENTIL     apixaban 5 mg Tab  Commonly known as: ELIQUIS     ferrous sulfate 325 (65 FE) MG EC tablet     furosemide 80 MG tablet  Commonly known as: LASIX     HYDROcodone-acetaminophen 7.5-325 mg per tablet  Commonly known as: NORCO     levothyroxine 125 MCG tablet  Commonly known as: SYNTHROID     metoprolol succinate 100 mg Cspx     omeprazole 40 MG capsule  Commonly known as: PRILOSEC     paroxetine 20 MG tablet  Commonly known as: PAXIL     potassium chloride 20 mEq  Commonly known as: K-TAB               Azael Rosales MD  Cardiology  [facility] - [facility]  12/14/2022

## 2023-01-19 ENCOUNTER — HOSPITAL ENCOUNTER (OUTPATIENT)
Dept: CARDIOLOGY | Facility: HOSPITAL | Age: 63
Discharge: HOME | End: 2023-01-19
Attending: INTERNAL MEDICINE
Payer: MEDICARE

## 2023-01-19 ENCOUNTER — ANESTHESIA EVENT (OUTPATIENT)
Dept: CARDIOLOGY | Facility: HOSPITAL | Age: 63
End: 2023-01-19
Payer: MEDICARE

## 2023-01-19 ENCOUNTER — ANESTHESIA (OUTPATIENT)
Dept: CARDIOLOGY | Facility: HOSPITAL | Age: 63
End: 2023-01-19
Payer: MEDICARE

## 2023-01-19 VITALS
RESPIRATION RATE: 26 BRPM | HEIGHT: 68 IN | DIASTOLIC BLOOD PRESSURE: 68 MMHG | SYSTOLIC BLOOD PRESSURE: 116 MMHG | TEMPERATURE: 98 F | BODY MASS INDEX: 42.63 KG/M2 | HEART RATE: 65 BPM | OXYGEN SATURATION: 98 % | WEIGHT: 281.31 LBS

## 2023-01-19 DIAGNOSIS — I48.0 PAROXYSMAL ATRIAL FIBRILLATION: Primary | ICD-10-CM

## 2023-01-19 DIAGNOSIS — Z01.818 PREOP TESTING: ICD-10-CM

## 2023-01-19 DIAGNOSIS — I48.0 PAROXYSMAL ATRIAL FIBRILLATION: ICD-10-CM

## 2023-01-19 LAB — BSA FOR ECHO PROCEDURE: 2.47 M2

## 2023-01-19 PROCEDURE — 93325 DOPPLER ECHO COLOR FLOW MAPG: CPT

## 2023-01-19 PROCEDURE — 93010 EKG 12-LEAD: ICD-10-PCS | Mod: ,,, | Performed by: INTERNAL MEDICINE

## 2023-01-19 PROCEDURE — 37000008 HC ANESTHESIA 1ST 15 MINUTES: Performed by: INTERNAL MEDICINE

## 2023-01-19 PROCEDURE — 93005 ELECTROCARDIOGRAM TRACING: CPT | Mod: 59

## 2023-01-19 PROCEDURE — 25000003 PHARM REV CODE 250: Performed by: STUDENT IN AN ORGANIZED HEALTH CARE EDUCATION/TRAINING PROGRAM

## 2023-01-19 PROCEDURE — 63600175 PHARM REV CODE 636 W HCPCS: Performed by: STUDENT IN AN ORGANIZED HEALTH CARE EDUCATION/TRAINING PROGRAM

## 2023-01-19 PROCEDURE — 93010 ELECTROCARDIOGRAM REPORT: CPT | Mod: ,,, | Performed by: INTERNAL MEDICINE

## 2023-01-19 PROCEDURE — 37000009 HC ANESTHESIA EA ADD 15 MINS: Performed by: INTERNAL MEDICINE

## 2023-01-19 RX ORDER — CLOPIDOGREL BISULFATE 75 MG/1
75 TABLET ORAL DAILY
Qty: 90 TABLET | Refills: 3 | Status: SHIPPED | OUTPATIENT
Start: 2023-01-19 | End: 2024-01-19

## 2023-01-19 RX ORDER — LIDOCAINE HYDROCHLORIDE 10 MG/ML
1 INJECTION, SOLUTION EPIDURAL; INFILTRATION; INTRACAUDAL; PERINEURAL ONCE
Status: CANCELLED | OUTPATIENT
Start: 2023-01-19 | End: 2023-01-19

## 2023-01-19 RX ORDER — ASPIRIN 81 MG/1
81 TABLET ORAL DAILY
Qty: 90 TABLET | Refills: 3 | Status: SHIPPED | OUTPATIENT
Start: 2023-01-19 | End: 2024-01-19

## 2023-01-19 RX ORDER — SODIUM CHLORIDE, SODIUM GLUCONATE, SODIUM ACETATE, POTASSIUM CHLORIDE AND MAGNESIUM CHLORIDE 30; 37; 368; 526; 502 MG/100ML; MG/100ML; MG/100ML; MG/100ML; MG/100ML
INJECTION, SOLUTION INTRAVENOUS CONTINUOUS
Status: CANCELLED | OUTPATIENT
Start: 2023-01-19

## 2023-01-19 RX ORDER — LIDOCAINE HYDROCHLORIDE 20 MG/ML
INJECTION INTRAVENOUS
Status: DISCONTINUED | OUTPATIENT
Start: 2023-01-19 | End: 2023-01-19

## 2023-01-19 RX ORDER — PROPOFOL 10 MG/ML
VIAL (ML) INTRAVENOUS
Status: DISCONTINUED | OUTPATIENT
Start: 2023-01-19 | End: 2023-01-19

## 2023-01-19 RX ORDER — ONDANSETRON 2 MG/ML
4 INJECTION INTRAMUSCULAR; INTRAVENOUS ONCE AS NEEDED
Status: CANCELLED | OUTPATIENT
Start: 2023-01-19 | End: 2034-06-17

## 2023-01-19 RX ADMIN — PROPOFOL 60 MG: 10 INJECTION, EMULSION INTRAVENOUS at 11:01

## 2023-01-19 RX ADMIN — PROPOFOL 40 MG: 10 INJECTION, EMULSION INTRAVENOUS at 11:01

## 2023-01-19 RX ADMIN — LIDOCAINE HYDROCHLORIDE 80 MG: 20 INJECTION INTRAVENOUS at 11:01

## 2023-01-19 RX ADMIN — SODIUM CHLORIDE, SODIUM GLUCONATE, SODIUM ACETATE, POTASSIUM CHLORIDE AND MAGNESIUM CHLORIDE: 526; 502; 368; 37; 30 INJECTION, SOLUTION INTRAVENOUS at 10:01

## 2023-01-19 RX ADMIN — PROPOFOL 20 MG: 10 INJECTION, EMULSION INTRAVENOUS at 11:01

## 2023-01-19 NOTE — ANESTHESIA POSTPROCEDURE EVALUATION
Anesthesia Post Evaluation    Patient: Azalea Marin    Procedure(s) Performed: * No procedures listed *    Final Anesthesia Type: general      Patient location during evaluation: Cath Lab  Patient participation: Yes- Able to Participate  Level of consciousness: oriented and awake  Post-procedure vital signs: reviewed and stable  Pain management: adequate  Airway patency: patent    PONV status at discharge: No PONV  Anesthetic complications: no      Cardiovascular status: stable and hemodynamically stable  Respiratory status: spontaneous ventilation and unassisted  Hydration status: euvolemic  Follow-up not needed.  Comments: [facility]          Vitals Value Taken Time   /68 01/19/23 1202   Temp ** 01/19/23 1342   Pulse 59 01/19/23 1211   Resp 20 01/19/23 1211   SpO2 98 % 01/19/23 1211   Vitals shown include unvalidated device data.      No case tracking events are documented in the log.      Pain/Paul Score: Paul Score: 10 (1/19/2023 11:37 AM)

## 2023-01-19 NOTE — DISCHARGE SUMMARY
[facility] - Cath Lab Services  Discharge Note  Short Stay    Transesophageal echo (CHRIS)      OUTCOME: Patient tolerated treatment/procedure well without complication and is now ready for discharge.    DISPOSITION: Home or Self Care    FINAL DIAGNOSIS:  Persistent atrial fibrillation    FOLLOWUP: In clinic    DISCHARGE INSTRUCTIONS:  No discharge procedures on file.     TIME SPENT ON DISCHARGE: 15 minutes

## 2023-01-19 NOTE — ANESTHESIA PREPROCEDURE EVALUATION
01/19/2023  Azalea Marin is a 62 y.o., female , who presents for the following:    Date/Time: 01/19/23 1130   Scheduled providers: Ronny Leyva MD   Procedure: TRANSESOPHAGEAL ECHO (CHRIS) (CUPID ONLY)   Diagnosis:        Paroxysmal atrial fibrillation [I48.0]       Preop testing [Z01.818]   Location: [facility] - [facility] Lab Services         Pre-op Assessment    I have reviewed the Patient Summary Reports.     I have reviewed the Nursing Notes. I have reviewed the NPO Status.   I have reviewed the Medications.     Review of Systems  Anesthesia Hx:  No problems with previous Anesthesia  Denies Family Hx of Anesthesia complications.   Denies Personal Hx of Anesthesia complications.   Social:  Former Smoker    Cardiovascular:   Hypertension CHF RODRIGUEZ    Pulmonary:   COPD Shortness of breath Sleep Apnea Home O2   Hepatic/GI:   GERD    Psych:   depression          Physical Exam  General: Alert and Oriented    Airway:  Mouth Opening: Normal  TM Distance: Normal  Tongue: Normal  Neck ROM: Normal ROM    Chest/Lungs:  Normal Respiratory Rate    Heart:  Rate: Normal  Rhythm: Regular Rhythm          TT E (12/14/23):  Summary     Limited echo done post-watchman.   The estimated ejection fraction is 60-65%.   No significant pericardial effusion or tamponade physiology.        Anesthesia Plan  Type of Anesthesia, risks & benefits discussed:    Anesthesia Type: Gen Natural Airway  Intra-op Monitoring Plan: Standard ASA Monitors  Post Op Pain Control Plan: IV/PO Opioids PRN  Induction:  IV  Airway Plan: Direct  Informed Consent: Informed consent signed with the Patient and all parties understand the risks and agree with anesthesia plan.  All questions answered. Patient consented to blood products? No  ASA Score: 4  Day of Surgery Review of History & Physical: H&P Update referred to the  surgeon/provider.  Anesthesia Plan Notes: Nasal cannula vs facemask supplemental oxygenation   For patients with DESIRAE/obesity, may consider SuperNoval Nasal CPAP      Ready For Surgery From Anesthesia Perspective.     .

## 2023-01-19 NOTE — TRANSFER OF CARE
"Anesthesia Transfer of Care Note    Patient: Azalea Marin    Procedure(s) Performed: * No procedures listed *    Patient location: Cath Lab    Anesthesia Type: general    Transport from OR: Transported from OR on room air with adequate spontaneous ventilation    Post pain: adequate analgesia    Post assessment: no apparent anesthetic complications    Post vital signs: stable    Level of consciousness: awake    Nausea/Vomiting: no nausea/vomiting    Complications: none    Transfer of care protocol was followed      Last vitals:   Visit Vitals  /83   Pulse 63   Temp 36.6 °C (97.8 °F) (Oral)   Ht 5' 8" (1.727 m)   Wt 127.6 kg (281 lb 4.9 oz)   SpO2 100   Breastfeeding No   BMI 42.77 kg/m²     "

## 2025-05-15 NOTE — Clinical Note
A handoff report was given to MYLES Bangura.  Pt dad was called [facility] to call the office regarding 5/16/25 appt need to be canceled.  Yesterday I spoke with him and he stated he is looking for second opinion for Ugo.   I spoke with the provider to review and he need to see a Pediatric specialist.

## 2025-06-20 ENCOUNTER — HOSPITAL ENCOUNTER (INPATIENT)
Facility: HOSPITAL | Age: 65
LOS: 5 days | Discharge: HOME HEALTH | DRG: 291 | End: 2025-06-25
Attending: EMERGENCY MEDICINE | Admitting: HOSPITALIST
Payer: MEDICARE

## 2025-06-20 DIAGNOSIS — I50.9 CHF (CONGESTIVE HEART FAILURE): ICD-10-CM

## 2025-06-20 DIAGNOSIS — I50.9 HEART FAILURE: ICD-10-CM

## 2025-06-20 DIAGNOSIS — R07.9 CHEST PAIN: ICD-10-CM

## 2025-06-20 DIAGNOSIS — R06.02 SHORTNESS OF BREATH: ICD-10-CM

## 2025-06-20 DIAGNOSIS — K92.1 HEMATOCHEZIA: ICD-10-CM

## 2025-06-20 DIAGNOSIS — I50.33 ACUTE ON CHRONIC DIASTOLIC CONGESTIVE HEART FAILURE: Primary | ICD-10-CM

## 2025-06-20 DIAGNOSIS — S39.92XA TAILBONE INJURY: ICD-10-CM

## 2025-06-20 DIAGNOSIS — E87.70 HYPERVOLEMIA, UNSPECIFIED HYPERVOLEMIA TYPE: ICD-10-CM

## 2025-06-20 DIAGNOSIS — M54.50 ACUTE MIDLINE LOW BACK PAIN WITHOUT SCIATICA: ICD-10-CM

## 2025-06-20 DIAGNOSIS — E87.1 HYPONATREMIA: ICD-10-CM

## 2025-06-20 DIAGNOSIS — R29.6 FREQUENT FALLS: ICD-10-CM

## 2025-06-20 DIAGNOSIS — J96.21 ACUTE ON CHRONIC RESPIRATORY FAILURE WITH HYPOXIA: ICD-10-CM

## 2025-06-20 LAB
ALBUMIN SERPL-MCNC: 3.5 G/DL (ref 3.4–4.8)
ALBUMIN/GLOB SERPL: 0.9 RATIO (ref 1.1–2)
ALP SERPL-CCNC: 245 UNIT/L (ref 40–150)
ALT SERPL-CCNC: 20 UNIT/L (ref 0–55)
ANION GAP SERPL CALC-SCNC: 6 MEQ/L
AST SERPL-CCNC: 23 UNIT/L (ref 11–45)
BASOPHILS # BLD AUTO: 0.04 X10(3)/MCL
BASOPHILS NFR BLD AUTO: 0.9 %
BILIRUB SERPL-MCNC: 0.9 MG/DL
BNP BLD-MCNC: 130.8 PG/ML
BUN SERPL-MCNC: 8.8 MG/DL (ref 9.8–20.1)
CALCIUM SERPL-MCNC: 9.5 MG/DL (ref 8.4–10.2)
CHLORIDE SERPL-SCNC: 94 MMOL/L (ref 98–107)
CO2 SERPL-SCNC: 28 MMOL/L (ref 23–31)
CREAT SERPL-MCNC: 0.73 MG/DL (ref 0.55–1.02)
CREAT/UREA NIT SERPL: 12
D DIMER PPP IA.FEU-MCNC: 1.11 UG/ML FEU (ref 0–0.5)
EOSINOPHIL # BLD AUTO: 0.06 X10(3)/MCL (ref 0–0.9)
EOSINOPHIL NFR BLD AUTO: 1.4 %
ERYTHROCYTE [DISTWIDTH] IN BLOOD BY AUTOMATED COUNT: 18.4 % (ref 11.5–17)
GFR SERPLBLD CREATININE-BSD FMLA CKD-EPI: >60 ML/MIN/1.73/M2
GLOBULIN SER-MCNC: 4.1 GM/DL (ref 2.4–3.5)
GLUCOSE SERPL-MCNC: 121 MG/DL (ref 82–115)
HCT VFR BLD AUTO: 30.4 % (ref 37–47)
HCT VFR BLD AUTO: 34.9 % (ref 37–47)
HGB BLD-MCNC: 10.2 G/DL (ref 12–16)
HGB BLD-MCNC: 11.5 G/DL (ref 12–16)
IMM GRANULOCYTES # BLD AUTO: 0.01 X10(3)/MCL (ref 0–0.04)
IMM GRANULOCYTES NFR BLD AUTO: 0.2 %
IRON SATN MFR SERPL: 21 % (ref 20–50)
IRON SERPL-MCNC: 63 UG/DL (ref 50–170)
LYMPHOCYTES # BLD AUTO: 0.57 X10(3)/MCL (ref 0.6–4.6)
LYMPHOCYTES NFR BLD AUTO: 12.9 %
MAGNESIUM SERPL-MCNC: 1.9 MG/DL (ref 1.6–2.6)
MCH RBC QN AUTO: 30.6 PG (ref 27–31)
MCHC RBC AUTO-ENTMCNC: 33.6 G/DL (ref 33–36)
MCV RBC AUTO: 91.3 FL (ref 80–94)
MONOCYTES # BLD AUTO: 0.43 X10(3)/MCL (ref 0.1–1.3)
MONOCYTES NFR BLD AUTO: 9.7 %
NEUTROPHILS # BLD AUTO: 3.31 X10(3)/MCL (ref 2.1–9.2)
NEUTROPHILS NFR BLD AUTO: 74.9 %
NRBC BLD AUTO-RTO: 0 %
OHS QRS DURATION: 88 MS
OHS QTC CALCULATION: 462 MS
OSMOLALITY SERPL: 267 MOSM/KG (ref 280–300)
OSMOLALITY UR: 197 MOSM/KG (ref 300–1300)
PLATELET # BLD AUTO: 176 X10(3)/MCL (ref 130–400)
PMV BLD AUTO: 9.2 FL (ref 7.4–10.4)
POTASSIUM SERPL-SCNC: 4.7 MMOL/L (ref 3.5–5.1)
PROT SERPL-MCNC: 7.6 GM/DL (ref 5.8–7.6)
RBC # BLD AUTO: 3.33 X10(6)/MCL (ref 4.2–5.4)
SODIUM SERPL-SCNC: 128 MMOL/L (ref 136–145)
SODIUM UR-SCNC: 45 MMOL/L
TIBC SERPL-MCNC: 239 UG/DL (ref 70–310)
TIBC SERPL-MCNC: 302 UG/DL (ref 250–450)
TRANSFERRIN SERPL-MCNC: 273 MG/DL (ref 173–360)
TROPONIN I SERPL-MCNC: <0.01 NG/ML (ref 0–0.04)
TSH SERPL-ACNC: 5.31 UIU/ML (ref 0.35–4.94)
WBC # BLD AUTO: 4.42 X10(3)/MCL (ref 4.5–11.5)

## 2025-06-20 PROCEDURE — 36415 COLL VENOUS BLD VENIPUNCTURE: CPT | Performed by: HOSPITALIST

## 2025-06-20 PROCEDURE — 85025 COMPLETE CBC W/AUTO DIFF WBC: CPT | Performed by: EMERGENCY MEDICINE

## 2025-06-20 PROCEDURE — 63600175 PHARM REV CODE 636 W HCPCS: Performed by: NURSE PRACTITIONER

## 2025-06-20 PROCEDURE — 25000003 PHARM REV CODE 250: Performed by: HOSPITALIST

## 2025-06-20 PROCEDURE — 80053 COMPREHEN METABOLIC PANEL: CPT | Performed by: EMERGENCY MEDICINE

## 2025-06-20 PROCEDURE — 96375 TX/PRO/DX INJ NEW DRUG ADDON: CPT

## 2025-06-20 PROCEDURE — 63600175 PHARM REV CODE 636 W HCPCS

## 2025-06-20 PROCEDURE — 83550 IRON BINDING TEST: CPT | Performed by: HOSPITALIST

## 2025-06-20 PROCEDURE — 84300 ASSAY OF URINE SODIUM: CPT

## 2025-06-20 PROCEDURE — 83735 ASSAY OF MAGNESIUM: CPT | Performed by: EMERGENCY MEDICINE

## 2025-06-20 PROCEDURE — 93005 ELECTROCARDIOGRAM TRACING: CPT

## 2025-06-20 PROCEDURE — 85014 HEMATOCRIT: CPT

## 2025-06-20 PROCEDURE — 84443 ASSAY THYROID STIM HORMONE: CPT | Performed by: HOSPITALIST

## 2025-06-20 PROCEDURE — 83880 ASSAY OF NATRIURETIC PEPTIDE: CPT | Performed by: EMERGENCY MEDICINE

## 2025-06-20 PROCEDURE — 83930 ASSAY OF BLOOD OSMOLALITY: CPT | Performed by: EMERGENCY MEDICINE

## 2025-06-20 PROCEDURE — 96374 THER/PROPH/DIAG INJ IV PUSH: CPT

## 2025-06-20 PROCEDURE — 99285 EMERGENCY DEPT VISIT HI MDM: CPT | Mod: 25

## 2025-06-20 PROCEDURE — 25000003 PHARM REV CODE 250: Performed by: NURSE PRACTITIONER

## 2025-06-20 PROCEDURE — 25500020 PHARM REV CODE 255: Performed by: HOSPITALIST

## 2025-06-20 PROCEDURE — 63600175 PHARM REV CODE 636 W HCPCS: Performed by: EMERGENCY MEDICINE

## 2025-06-20 PROCEDURE — 11000001 HC ACUTE MED/SURG PRIVATE ROOM

## 2025-06-20 PROCEDURE — 83935 ASSAY OF URINE OSMOLALITY: CPT | Performed by: EMERGENCY MEDICINE

## 2025-06-20 PROCEDURE — 85379 FIBRIN DEGRADATION QUANT: CPT | Performed by: HOSPITALIST

## 2025-06-20 PROCEDURE — 84484 ASSAY OF TROPONIN QUANT: CPT | Performed by: EMERGENCY MEDICINE

## 2025-06-20 PROCEDURE — 93010 ELECTROCARDIOGRAM REPORT: CPT | Mod: ,,, | Performed by: INTERNAL MEDICINE

## 2025-06-20 RX ORDER — GABAPENTIN 600 MG/1
600 TABLET ORAL 2 TIMES DAILY
Status: ON HOLD | COMMUNITY
End: 2025-06-25 | Stop reason: HOSPADM

## 2025-06-20 RX ORDER — BUPROPION HYDROCHLORIDE 100 MG/1
100 TABLET, EXTENDED RELEASE ORAL 2 TIMES DAILY
Status: DISCONTINUED | OUTPATIENT
Start: 2025-06-20 | End: 2025-06-25 | Stop reason: HOSPADM

## 2025-06-20 RX ORDER — NALOXONE HCL 0.4 MG/ML
0.02 VIAL (ML) INJECTION
Status: DISCONTINUED | OUTPATIENT
Start: 2025-06-20 | End: 2025-06-25 | Stop reason: HOSPADM

## 2025-06-20 RX ORDER — IBUPROFEN 200 MG
16 TABLET ORAL
Status: DISCONTINUED | OUTPATIENT
Start: 2025-06-20 | End: 2025-06-25 | Stop reason: HOSPADM

## 2025-06-20 RX ORDER — HYDROCODONE BITARTRATE AND ACETAMINOPHEN 5; 325 MG/1; MG/1
1 TABLET ORAL EVERY 4 HOURS PRN
Refills: 0 | Status: DISCONTINUED | OUTPATIENT
Start: 2025-06-20 | End: 2025-06-24

## 2025-06-20 RX ORDER — GLUCAGON 1 MG
1 KIT INJECTION
Status: DISCONTINUED | OUTPATIENT
Start: 2025-06-20 | End: 2025-06-25 | Stop reason: HOSPADM

## 2025-06-20 RX ORDER — IBUPROFEN 200 MG
24 TABLET ORAL
Status: DISCONTINUED | OUTPATIENT
Start: 2025-06-20 | End: 2025-06-25 | Stop reason: HOSPADM

## 2025-06-20 RX ORDER — TALC
6 POWDER (GRAM) TOPICAL NIGHTLY PRN
Status: DISCONTINUED | OUTPATIENT
Start: 2025-06-21 | End: 2025-06-25 | Stop reason: HOSPADM

## 2025-06-20 RX ORDER — SODIUM CHLORIDE 0.9 % (FLUSH) 0.9 %
10 SYRINGE (ML) INJECTION EVERY 12 HOURS PRN
Status: DISCONTINUED | OUTPATIENT
Start: 2025-06-20 | End: 2025-06-25 | Stop reason: HOSPADM

## 2025-06-20 RX ORDER — CHOLECALCIFEROL (VITAMIN D3) 25 MCG
1000 TABLET ORAL DAILY
COMMUNITY

## 2025-06-20 RX ORDER — FUROSEMIDE 10 MG/ML
40 INJECTION INTRAMUSCULAR; INTRAVENOUS
Status: COMPLETED | OUTPATIENT
Start: 2025-06-20 | End: 2025-06-20

## 2025-06-20 RX ORDER — LEVOTHYROXINE SODIUM 125 UG/1
125 TABLET ORAL DAILY
Status: DISCONTINUED | OUTPATIENT
Start: 2025-06-21 | End: 2025-06-25 | Stop reason: HOSPADM

## 2025-06-20 RX ORDER — ENOXAPARIN SODIUM 100 MG/ML
40 INJECTION SUBCUTANEOUS EVERY 24 HOURS
Status: DISCONTINUED | OUTPATIENT
Start: 2025-06-20 | End: 2025-06-21

## 2025-06-20 RX ORDER — FUROSEMIDE 10 MG/ML
40 INJECTION INTRAMUSCULAR; INTRAVENOUS
Status: DISCONTINUED | OUTPATIENT
Start: 2025-06-20 | End: 2025-06-21

## 2025-06-20 RX ORDER — METHOCARBAMOL 100 MG/ML
1000 INJECTION, SOLUTION INTRAMUSCULAR; INTRAVENOUS ONCE
Status: COMPLETED | OUTPATIENT
Start: 2025-06-20 | End: 2025-06-20

## 2025-06-20 RX ORDER — ALBUTEROL SULFATE 0.83 MG/ML
2.5 SOLUTION RESPIRATORY (INHALATION) EVERY 8 HOURS
Status: DISCONTINUED | OUTPATIENT
Start: 2025-06-21 | End: 2025-06-25 | Stop reason: HOSPADM

## 2025-06-20 RX ORDER — ONDANSETRON HYDROCHLORIDE 2 MG/ML
4 INJECTION, SOLUTION INTRAVENOUS
Status: COMPLETED | OUTPATIENT
Start: 2025-06-20 | End: 2025-06-20

## 2025-06-20 RX ORDER — MORPHINE SULFATE 4 MG/ML
4 INJECTION, SOLUTION INTRAMUSCULAR; INTRAVENOUS EVERY 4 HOURS PRN
Refills: 0 | Status: DISCONTINUED | OUTPATIENT
Start: 2025-06-20 | End: 2025-06-25 | Stop reason: HOSPADM

## 2025-06-20 RX ORDER — BUPROPION HYDROCHLORIDE 100 MG/1
100 TABLET, EXTENDED RELEASE ORAL 2 TIMES DAILY
COMMUNITY

## 2025-06-20 RX ORDER — MORPHINE SULFATE 4 MG/ML
4 INJECTION, SOLUTION INTRAMUSCULAR; INTRAVENOUS
Refills: 0 | Status: COMPLETED | OUTPATIENT
Start: 2025-06-20 | End: 2025-06-20

## 2025-06-20 RX ORDER — DIAZEPAM 5 MG/1
5 TABLET ORAL EVERY 8 HOURS PRN
Status: DISCONTINUED | OUTPATIENT
Start: 2025-06-20 | End: 2025-06-21

## 2025-06-20 RX ADMIN — ONDANSETRON 4 MG: 2 INJECTION INTRAMUSCULAR; INTRAVENOUS at 10:06

## 2025-06-20 RX ADMIN — MORPHINE SULFATE 4 MG: 4 INJECTION, SOLUTION INTRAMUSCULAR; INTRAVENOUS at 09:06

## 2025-06-20 RX ADMIN — BUPROPION HYDROCHLORIDE 100 MG: 100 TABLET, FILM COATED, EXTENDED RELEASE ORAL at 08:06

## 2025-06-20 RX ADMIN — PERFLUTREN 1 ML: 6.52 INJECTION, SUSPENSION INTRAVENOUS at 07:06

## 2025-06-20 RX ADMIN — MORPHINE SULFATE 4 MG: 4 INJECTION, SOLUTION INTRAMUSCULAR; INTRAVENOUS at 02:06

## 2025-06-20 RX ADMIN — MORPHINE SULFATE 4 MG: 4 INJECTION, SOLUTION INTRAMUSCULAR; INTRAVENOUS at 10:06

## 2025-06-20 RX ADMIN — METHOCARBAMOL 1000 MG: 100 INJECTION INTRAMUSCULAR; INTRAVENOUS at 10:06

## 2025-06-20 RX ADMIN — ENOXAPARIN SODIUM 40 MG: 40 INJECTION SUBCUTANEOUS at 06:06

## 2025-06-20 RX ADMIN — FUROSEMIDE 40 MG: 10 INJECTION, SOLUTION INTRAVENOUS at 01:06

## 2025-06-20 RX ADMIN — DIAZEPAM 5 MG: 5 TABLET ORAL at 08:06

## 2025-06-20 RX ADMIN — HYDROCODONE BITARTRATE AND ACETAMINOPHEN 1 TABLET: 5; 325 TABLET ORAL at 01:06

## 2025-06-20 NOTE — ED PROVIDER NOTES
Encounter Date: 6/20/2025    SCRIBE #1 NOTE: I, Ifeanyi Eaton, am scribing for, and in the presence of,  Shelia Red MD. I have scribed the following portions of the note - the EKG reading. Other sections scribed: HPI, ROS, PE.       History     Chief Complaint   Patient presents with    Fall     Pt presents to ED in 2L nasal cannula in wheelchair c/o tailbone pain an difficulty walking after fall 1 week ago. Pt also c/o leg swelling after being taken off lasix recently. Pt denies any other complaints at this time. Denies cp/sob     65 year old female with a pmhx of A-fib, CHF, COPD, GERD, HLD, and HTN presents to the ED after a fall that occurred 1 week ago on 6/10/25.  She reports associated symptoms of pain in her tailbone region that radiates to her rectum, rectal bleeding, and leg swelling in her BLE's.  The patient denies any syncope, light-headedness, or problems balancing.  She reports falling originally due to having weakness in her legs that caused them to give out beneath her where she fell and hit her face.  She reports having impacted her right knee as well.  She states that she had migraines following the fall that have resolved since.  She reports using an extension on the seat of her toilet, and noticed bright red blood dripping on the side when using the bathroom.  Per chart review, the patient was seen at [facility]'s ED on the 10th after having been to her PCP's office that morning.  The PCP note was updated on 6/11 after the patient's ED visit that reports that she had been hypokalemic and hyponatremic causing her weakness.  She had a head CT with no acute findings and no maxillofacial fractures, she also had an X-ray of her tailbone with no acute findings other than chronic DJD.  She also was found to have chronic arthritis in her right knee, but no acute findings.  She was prescribed Norco 7.5 for pain, and she reports today that she has been taking 1 1/2 pills for her pain  due to the severity.  She states that she is usually on 2L NC at night, but has recently required her O2 constantly.  She reports that during her ED visit she had a PT evaluation where she was sent home with home health and a referral for PT, however she reports not being able to start due to her difficulty ambulating secondary to her weakness.  She also reports being taken off of Lasix, but she began to continue it on her own this past Saturday due to the severity of her leg swelling.  She reports that her cardiologist is Dr. Ronny Leyva with [facility].    PCP: Abbi Jacobsen MD: [phone]    The history is provided by the patient and medical records. No  was used.     Review of patient's allergies indicates:  No Known Allergies  Past Medical History:   Diagnosis Date    Arthritis     Atrial fibrillation     Cellulitis and abscess of leg     CHF (congestive heart failure)     Claustrophobia     COPD (chronic obstructive pulmonary disease)     Depression     GERD (gastroesophageal reflux disease)     Hyperlipidemia     Hypertension     Obesity     Oxygen dependent     2.5L    Sleep apnea     does not use CPAP    Thyroid disease      Past Surgical History:   Procedure Laterality Date    CARDIAC CATHETERIZATION      CLOSURE OF LEFT ATRIAL APPENDAGE USING DEVICE N/A 12/13/2022    Procedure: CLOSURE, LEFT ATRIAL APPENDAGE, USING DEVICE;  Surgeon: Ronny Leyva MD;  Location: [facility] CATH LAB;  Service: Cardiology;  Laterality: N/A;  WATCHMAN W/ INTRA OP CHRIS    ECHOCARDIOGRAM,TRANSESOPHAGEAL N/A 12/13/2022    Procedure: ECHOCARDIOGRAM,TRANSESOPHAGEAL;  Surgeon: Brandon Diagnostic Provider;  Location: [facility] CATH LAB;  Service: Cardiology;  Laterality: N/A;    HYSTERECTOMY      TRANSESOPHAGEAL ECHOCARDIOGRAPHY       No family history on file.  Social History[1]  Review of Systems   Constitutional:         Generalized weakness   Cardiovascular:  Positive for leg swelling.   Gastrointestinal:  Positive for anal  bleeding.   Musculoskeletal:  Positive for back pain (tailbone region radiating to rectum).   Neurological:  Negative for dizziness, syncope, light-headedness and headaches.       Physical Exam     Initial Vitals [25 0935]   BP Pulse Resp Temp SpO2   (!) 146/64 106 20 98.4 °F (36.9 °C) 97 %      MAP       --         Physical Exam    Nursing note and vitals reviewed.  Constitutional: She appears well-developed and well-nourished. She is not diaphoretic. No distress.   HENT:   Head: Normocephalic.   Nose: Nose normal. Mouth/Throat: Oropharynx is clear and moist.   Eyes: Conjunctivae and EOM are normal. Pupils are equal, round, and reactive to light.   Neck: Trachea normal. Neck supple.   Normal range of motion.  Cardiovascular:  Regular rhythm, normal heart sounds and intact distal pulses.           No murmur heard.  Pulmonary/Chest: Breath sounds normal. No respiratory distress. She has no wheezes. She has no rhonchi. She has no rales. She exhibits no tenderness.   Abdominal: Abdomen is soft. Bowel sounds are normal. She exhibits no distension and no mass. There is no abdominal tenderness. There is no rebound and no guarding.   Musculoskeletal:         General: Edema present.      Cervical back: Normal range of motion and neck supple.      Right lower le+ Edema present.      Left lower le+ Edema present.      Comments: Healing bruise to the coccyx with associated tenderness      Neurological: She is alert and oriented to person, place, and time. She has normal strength. No cranial nerve deficit or sensory deficit.   Skin: Skin is warm and dry. Capillary refill takes less than 2 seconds. No abscess noted. No erythema. No pallor.   Psychiatric: She has a normal mood and affect. Her behavior is normal. Judgment and thought content normal.         ED Course   Procedures  Labs Reviewed   B-TYPE NATRIURETIC PEPTIDE - Abnormal       Result Value    Natriuretic Peptide 130.8 (*)    COMPREHENSIVE METABOLIC PANEL  - Abnormal    Sodium 128 (*)     Potassium 4.7      Chloride 94 (*)     CO2 28      Glucose 121 (*)     Blood Urea Nitrogen 8.8 (*)     Creatinine 0.73      Calcium 9.5      Protein Total 7.6      Albumin 3.5      Globulin 4.1 (*)     Albumin/Globulin Ratio 0.9 (*)     Bilirubin Total 0.9       (*)     ALT 20      AST 23      eGFR >60      Anion Gap 6.0      BUN/Creatinine Ratio 12     CBC WITH DIFFERENTIAL - Abnormal    WBC 4.42 (*)     RBC 3.33 (*)     Hgb 10.2 (*)     Hct 30.4 (*)     MCV 91.3      MCH 30.6      MCHC 33.6      RDW 18.4 (*)     Platelet 176      MPV 9.2      Neut % 74.9      Lymph % 12.9      Mono % 9.7      Eos % 1.4      Basophil % 0.9      Imm Grans % 0.2      Neut # 3.31      Lymph # 0.57 (*)     Mono # 0.43      Eos # 0.06      Baso # 0.04      Imm Gran # 0.01      NRBC% 0.0     OSMOLALITY, SERUM - Abnormal    Osmolality 267 (*)    MAGNESIUM - Normal    Magnesium Level 1.90     TROPONIN I - Normal    Troponin-I <0.010     CBC W/ AUTO DIFFERENTIAL    Narrative:     The following orders were created for panel order CBC auto differential.  Procedure                               Abnormality         Status                     ---------                               -----------         ------                     CBC with Differential[[id number]]        Abnormal            Final result                 Please view results for these tests on the individual orders.   OSMOLALITY, URINE RANDOM     EKG Readings: (Independently Interpreted)   Initial Reading: No STEMI. Rhythm: Atrial Fibrillation. Heart Rate: 98. Ectopy: No Ectopy. Conduction: Normal. T Waves: Normal. Axis: Normal. Clinical Impression: Atrial Fibrillation Other Impression: Non-specific ST abnormality in the anterior leads   Performed at 1113       Imaging Results              X-Ray Chest AP Portable (Final result)  Result time 06/20/25 11:12:20      Final result by Bert Mar MD (06/20/25 11:12:20)                    Impression:      Question some pulmonary vascular congestion.      Electronically signed by: Bert Mar  Date:    06/20/2025  Time:    11:12               Narrative:    EXAMINATION:  XR CHEST AP PORTABLE    CLINICAL HISTORY:  shortness of breath;    COMPARISON:  20 February 2020    FINDINGS:  Frontal view of the chest was obtained. Stable cardiomegaly.  Mild interstitial prominence.  No dense consolidation or pneumothorax.                                       X-Ray Sacrum And Coccyx (Final result)  Result time 06/20/25 11:21:05      Final result by Edel Waggoner MD (06/20/25 11:21:05)                   Impression:      No definite displaced fracture identified.      Electronically signed by: Edel Waggoner  Date:    06/20/2025  Time:    11:21               Narrative:    EXAMINATION:  XR SACRUM AND COCCYX    CLINICAL HISTORY:  Unspecified injury of lower back, initial encounter    COMPARISON:  None.    FINDINGS:  There is no definite displaced sacrococcygeal fracture identified.  The soft tissues are unremarkable.                                    X-Rays:   Independently Interpreted Readings:   Chest X-Ray: Cardiomegaly present.  Increased vascular markings consistent with CHF are present.     Medications   furosemide injection 40 mg (has no administration in time range)   morphine injection 4 mg (4 mg Intravenous Given 6/20/25 1023)   ondansetron injection 4 mg (4 mg Intravenous Given 6/20/25 1023)   methocarbamoL injection 1,000 mg (1,000 mg Intravenous Given 6/20/25 1023)     Medical Decision Making  Differential diagnosis include but are not limited to: renal failure, anemia, congestive heart failure, coccyx fracture, debility.  Cbc, cmp, trop, bnp, urine and serum osm, EKG, cxr, coccyx xr ordered and reviewed  Hyponatremia with s/s overload likely due to discontinuation of her lasix complicating her preceding progressive weakness with frequent falls  Diuresis, admit  I reviewed the paperwork from  og and she did have imaging of her head/face after the fall      Problems Addressed:  Acute midline low back pain without sciatica: acute illness or injury that poses a threat to life or bodily functions  Acute on chronic diastolic congestive heart failure: acute illness or injury that poses a threat to life or bodily functions  Acute on chronic respiratory failure with hypoxia: acute illness or injury that poses a threat to life or bodily functions  Frequent falls: acute illness or injury that poses a threat to life or bodily functions  Hypervolemia, unspecified hypervolemia type: acute illness or injury that poses a threat to life or bodily functions  Hyponatremia: acute illness or injury that poses a threat to life or bodily functions  Shortness of breath: acute illness or injury that poses a threat to life or bodily functions  Tailbone injury: acute illness or injury that poses a threat to life or bodily functions    Amount and/or Complexity of Data Reviewed  Independent Historian:      Details:   Per chart review, the patient was seen at [facility]'s ED on the 10th after having been to her PCP's office that morning.  The PCP note was updated on 6/11 after the patient's ED visit that reports that she had been hypokalemic and hyponatremic causing her weakness.  She had a head CT with no acute findings and no maxillofacial fractures, she also had an X-ray of her tailbone with no acute findings other than chronic DJD.  She also was found to have chronic arthritis in her right knee, but no acute findings.  She was prescribed Norco 7.5 for pain, and she reports today that she has been taking 1 1/2 pills for her pain due to the severity.   External Data Reviewed: notes.     Details: Diastolic chf, hyponatremia in the past  Labs: ordered. Decision-making details documented in ED Course.  Radiology: ordered and independent interpretation performed.  ECG/medicine tests: ordered and independent interpretation  performed.     Details: Initial Reading: No STEMI. Rhythm: Atrial Fibrillation. Heart Rate: 98. Ectopy: No Ectopy. Conduction: Normal. T Waves: Normal. Axis: Normal. Clinical Impression: Atrial Fibrillation Other Impression: Non-specific ST abnormality in the anterior leads   Performed at 1113     Risk  OTC drugs.  Prescription drug management.  Parenteral controlled substances.  Decision regarding hospitalization.            Scribe Attestation:   Scribe #1: I performed the above scribed service and the documentation accurately describes the services I performed. I attest to the accuracy of the note.  Comments: Attending:   Physician Attestation Statement for Scribe #1: IShelia MD, personally performed the services described in this documentation. All medical record entries made by the scribe were at my direction and in my presence.  I have reviewed the chart and agree that the record reflects my personal performance and is accurate and complete.        Attending Attestation:           Physician Attestation for Scribe:  Physician Attestation Statement for Scribe #1: Shelia LARIOS MD, reviewed documentation, as scribed by Ifeanyi Eaton in my presence, and it is both accurate and complete.             ED Course as of 06/20/25 1138   Fri Jun 20, 2025   1128 Updated pt. Reports she is up at least 10 lbs. Iv lasix ordered, will plan to admit to hospitalist [BS]   1137 Osmolality(!): 267 [BS]      ED Course User Index  [BS] Shelia Red MD                           Clinical Impression:  Final diagnoses:  [S39.92XA] Tailbone injury  [R06.02] Shortness of breath  [I50.33] Acute on chronic diastolic congestive heart failure (Primary)  [J96.21] Acute on chronic respiratory failure with hypoxia  [E87.70] Hypervolemia, unspecified hypervolemia type  [E87.1] Hyponatremia  [R29.6] Frequent falls  [M54.50] Acute midline low back pain without sciatica                       [1]   Social  History  Tobacco Use    Smoking status: Former     Current packs/day: 0.00     Types: Cigarettes     Quit date: 2010     Years since quitting: 15.4    Smokeless tobacco: Never   Substance Use Topics    Alcohol use: Yes     Alcohol/week: 3.0 standard drinks of alcohol     Types: 3 Glasses of wine per week    Drug use: Not Currently        Shelia Red MD  06/20/25 5219

## 2025-06-20 NOTE — H&P
[facility] Hospital Medicine - H&P Note    Patient Name: Azalea Marin  : 1960  MRN: [id number]  PCP: Abbi Jacobsen MD  Admitting Physician: DEBORAH NERI MD  Admission Class: IP- Inpatient   Length of Stay: 0  Face-to-Face encounter date: 2025  Code status: full    Chief Complaint   Fall (Pt presents to ED in 2L nasal cannula in wheelchair c/o tailbone pain an difficulty walking after fall 1 week ago. Pt also c/o leg swelling after being taken off lasix recently. Pt denies any other complaints at this time. Denies cp/sob)      History of Present Illness   65-year-old female with a past medical history of congestive heart failure, atrial fibrillation, COPD, GERD, HLD, HTN presented to the ED with shortness of breath and worsening lower extremity swelling bilaterally as well as muscle spasms.  Patient reports associated frequent falling but has not fallen since being discharged from [facility] after being seen on 06/10/2025.  Patient was seen at [facility] for frequent falling, lightheadedness, problems with balance and was treated for hypokalemia and hyponatremia.  She was then discharged and was taken off of her Lasix by the Nephrology team there due to concerns with her kidneys.  She then developed worsening shortness of breath and lower extremity swelling over the past week and although she restarted her Lasix on her own her symptoms did not improve.  She also reports that the associated muscle spasms are primarily around her rectum and that she has never had these before.  She also reports some associated BRBPR and difficulty having bowel movements.      ROS   Except as documented, all other systems reviewed and negative     Past Medical History     Past Medical History:   Diagnosis Date    Arthritis     Atrial fibrillation     Cellulitis and abscess of leg     CHF (congestive heart failure)     Claustrophobia     COPD (chronic obstructive pulmonary  disease)     Depression     GERD (gastroesophageal reflux disease)     Hyperlipidemia     Hypertension     Obesity     Oxygen dependent     2.5L    Sleep apnea     does not use CPAP    Thyroid disease        Past Surgical History     Past Surgical History:   Procedure Laterality Date    CARDIAC CATHETERIZATION      CLOSURE OF LEFT ATRIAL APPENDAGE USING DEVICE N/A 12/13/2022    Procedure: CLOSURE, LEFT ATRIAL APPENDAGE, USING DEVICE;  Surgeon: Ronny Leyva MD;  Location: [facility] CATH LAB;  Service: Cardiology;  Laterality: N/A;  WATCHMAN W/ INTRA OP CHRIS    ECHOCARDIOGRAM,TRANSESOPHAGEAL N/A 12/13/2022    Procedure: ECHOCARDIOGRAM,TRANSESOPHAGEAL;  Surgeon: Brandon Diagnostic Provider;  Location: [facility] CATH LAB;  Service: Cardiology;  Laterality: N/A;    HYSTERECTOMY      TRANSESOPHAGEAL ECHOCARDIOGRAPHY         Social History     Screening for Social Drivers for health: Patient screened for food insecurity, housing instability, transportation needs, utility   difficulties, and interpersonal safety (select all that apply as identified as concern)  []Housing or Food  []Transportation Needs  []Utility Difficulties  []Interpersonal safety  [x]None    Social History     Tobacco Use    Smoking status: Former     Current packs/day: 0.00     Types: Cigarettes     Quit date: 2010     Years since quitting: 15.4    Smokeless tobacco: Never   Substance Use Topics    Alcohol use: Yes     Alcohol/week: 3.0 standard drinks of alcohol     Types: 3 Glasses of wine per week        Family History   Reviewed and negative    Allergies   Patient has no known allergies.    Home Medications     Prior to Admission medications    Medication Sig Start Date End Date Taking? Authorizing Provider   albuterol (PROVENTIL) 2.5 mg /3 mL (0.083 %) nebulizer solution Take 2.5 mg by nebulization every 4 (four) hours as needed.    Provider, Historical   aspirin (ECOTRIN) 81 MG EC tablet Take 1 tablet (81 mg total) by mouth once daily. 1/19/23 1/19/24  Gordon,  "Ronny HATCH MD   clopidogreL (PLAVIX) 75 mg tablet Take 1 tablet (75 mg total) by mouth once daily. 1/19/23 1/19/24  Ronny Leyva MD   ferrous sulfate 325 (65 FE) MG EC tablet Take 325 mg by mouth once daily. 4/7/22   Provider, Historical   furosemide (LASIX) 80 MG tablet Take 80 mg by mouth 2 (two) times daily as needed (swelling/weight gain). 3/25/22   Provider, Historical   HYDROcodone-acetaminophen (NORCO) 7.5-325 mg per tablet Take 1 tablet by mouth 2 (two) times daily as needed. 8/2/22   Provider, Historical   levothyroxine (SYNTHROID) 125 MCG tablet Take 125 mcg by mouth once daily. 9/28/22   Provider, Historical   metoprolol succinate 100 mg CSpX Take 100 mg by mouth Daily. 3/25/22   Provider, Historical   omeprazole (PRILOSEC) 40 MG capsule Take 40 mg by mouth 2 (two) times daily before meals. 7/15/22   Provider, Historical   paroxetine (PAXIL) 20 MG tablet Take 1 tablet by mouth once daily. 3/25/22   Provider, Historical   potassium chloride (K-TAB) 20 mEq Take 20 mEq by mouth 2 (two) times a day. 7/14/22   Provider, Historical        Physical Exam   Vital Signs  Temp:  [98.4 °F (36.9 °C)]   Pulse:  []   Resp:  [17-22]   BP: (102-146)/(56-82)   SpO2:  [94 %-99 %]    General: Well developed, well nourished. In no acute distress, non-toxic appearing  HEENT: NC/AT  Neck:  Supple. No JVD  Chest: Clear bilaterally, no wheezing, no accessory muscle use.  CVS: Regular rhythm. Normal S1/S2. 3+ BLE pitting edema with chronic venous stasis skin changed  Abdomen: mildly distended, normoactive BS, soft and non-tender.  MSK: No obvious deformity or joint swelling  Skin: Warm and dry. BLE venous stasis skin changes.  Neuro: AAOx3, no focal neurological deficit  Psych: Cooperative      Labs     Recent Labs     06/20/25  1014   WBC 4.42*   RBC 3.33*   HGB 10.2*   HCT 30.4*   MCV 91.3   MCH 30.6   MCHC 33.6   RDW 18.4*        No results for input(s): "PROTIME", "INR", "PTT", "D-DIMER", "FERRITIN", "IRON", " ""TRANS", "TIBC", "LABIRON", "DMGNNVOR96", "FOLATE", "LDH", "HAPTOGLOBIN", "RETICCNTAUTO", "RETABS", "PERIPSMEAREV" in the last 72 hours.   Recent Labs     06/20/25  1014   *   K 4.7   CO2 28   BUN 8.8*   CREATININE 0.73   EGFRNORACEVR >60   CALCIUM 9.5   MG 1.90   ALBUMIN 3.5   GLOBULIN 4.1*   ALKPHOS 245*   ALT 20   AST 23   BILITOT 0.9   .8*     No results for input(s): "LACTIC" in the last 72 hours.  Recent Labs     06/20/25  1014   TROPONINI <0.010        Microbiology Results (last 7 days)       ** No results found for the last 168 hours. **           Imaging     X-Ray Chest AP Portable   Final Result      Question some pulmonary vascular congestion.         Electronically signed by: Bert Mar   Date:    06/20/2025   Time:    11:12      X-Ray Sacrum And Coccyx   Final Result      No definite displaced fracture identified.         Electronically signed by: Edel Waggoner   Date:    06/20/2025   Time:    11:21        Assessment & Plan   1. Acute exacerbation on chronic congestive heart failure   Patient treated with furosemide 40 mg IV, morphine, Zofran, methocarbamol and reports improvement in shortness of breath and lower extremity swelling.  We will continue Lasix and consult Cardiology as desired by the attending physician.      2. Hyponatremia  Possibly due to volume overload and contributing to muscle spasms.  Repeat sodium every 8 hours and obtain urine osmoles and serum osmoles.  Continue Lasix.      3. Anemia   H&H 10/30 and normocytic.  BRBPR noted.  Patient reports no history of GI bleed.  Consult Gastroenterology.  Type and cross.    4. COPD   Continue home medications     General Plan:  -Telemetry monitoring  -O2 delivery as needed  -Daily weights  -Strict I & O  -Monitor WBC, signs of infection  -Monitor Creatinine  -Blood cultures x 2 pending  -Sliding scale insulin with accu-checks  -Antihypertensives as needed  -Resume home meds as appropriate  -Labs in AM       VTE Prophylaxis: " ARICs       I, Gopi Ramirez PA-C have reviewed and discussed this case with DEBORAH NERI MD. Please see addendum for further assessment and plan from attending MD.

## 2025-06-21 LAB
ALBUMIN SERPL-MCNC: 2.9 G/DL (ref 3.4–4.8)
ALBUMIN/GLOB SERPL: 0.9 RATIO (ref 1.1–2)
ALP SERPL-CCNC: 187 UNIT/L (ref 40–150)
ALT SERPL-CCNC: 16 UNIT/L (ref 0–55)
ANION GAP SERPL CALC-SCNC: 7 MEQ/L
AORTIC SIZE INDEX (SOV): 1.5 CM/M2
APICAL FOUR CHAMBER EJECTION FRACTION: 66 %
APICAL TWO CHAMBER EJECTION FRACTION: 59 %
AST SERPL-CCNC: 15 UNIT/L (ref 11–45)
AV INDEX (PROSTH): 0.49
AV MEAN GRADIENT: 9 MMHG
AV PEAK GRADIENT: 16 MMHG
AV VALVE AREA BY VELOCITY RATIO: 1.6 CM²
AV VALVE AREA: 1.5 CM²
AV VELOCITY RATIO: 0.5
BACTERIA #/AREA URNS AUTO: NORMAL /HPF
BASOPHILS # BLD AUTO: 0.01 X10(3)/MCL
BASOPHILS NFR BLD AUTO: 0.3 %
BILIRUB SERPL-MCNC: 0.8 MG/DL
BILIRUB UR QL STRIP.AUTO: NEGATIVE
BSA FOR ECHO PROCEDURE: 2.39 M2
BUN SERPL-MCNC: 7.5 MG/DL (ref 9.8–20.1)
CALCIUM SERPL-MCNC: 8.8 MG/DL (ref 8.4–10.2)
CHLORIDE SERPL-SCNC: 96 MMOL/L (ref 98–107)
CLARITY UR: CLEAR
CO2 SERPL-SCNC: 29 MMOL/L (ref 23–31)
COLOR UR AUTO: YELLOW
CREAT SERPL-MCNC: 0.61 MG/DL (ref 0.55–1.02)
CREAT/UREA NIT SERPL: 12
CV ECHO LV RWT: 0.41 CM
DOP CALC AO PEAK VEL: 2 M/S
DOP CALC AO VTI: 39 CM
DOP CALC LVOT AREA: 3.1 CM2
DOP CALC LVOT DIAMETER: 2 CM
DOP CALC LVOT PEAK VEL: 1 M/S
DOP CALC LVOT STROKE VOLUME: 60.3 CM3
DOP CALC MV VTI: 40.6 CM
DOP CALCLVOT PEAK VEL VTI: 19.2 CM
ECHO LV POSTERIOR WALL: 1.2 CM (ref 0.6–1.1)
EOSINOPHIL # BLD AUTO: 0.06 X10(3)/MCL (ref 0–0.9)
EOSINOPHIL NFR BLD AUTO: 1.6 %
ERYTHROCYTE [DISTWIDTH] IN BLOOD BY AUTOMATED COUNT: 18.5 % (ref 11.5–17)
FRACTIONAL SHORTENING: 27.6 % (ref 28–44)
GFR SERPLBLD CREATININE-BSD FMLA CKD-EPI: >60 ML/MIN/1.73/M2
GLOBULIN SER-MCNC: 3.4 GM/DL (ref 2.4–3.5)
GLUCOSE SERPL-MCNC: 97 MG/DL (ref 82–115)
GLUCOSE UR QL STRIP: NORMAL
HCT VFR BLD AUTO: 27.3 % (ref 37–47)
HCT VFR BLD AUTO: 27.8 % (ref 37–47)
HCT VFR BLD AUTO: 29.1 % (ref 37–47)
HCT VFR BLD AUTO: 31.7 % (ref 37–47)
HGB BLD-MCNC: 10.3 G/DL (ref 12–16)
HGB BLD-MCNC: 8.8 G/DL (ref 12–16)
HGB BLD-MCNC: 8.9 G/DL (ref 12–16)
HGB BLD-MCNC: 9.5 G/DL (ref 12–16)
HGB UR QL STRIP: NEGATIVE
HR MV ECHO: 79 BPM
IMM GRANULOCYTES # BLD AUTO: 0.02 X10(3)/MCL (ref 0–0.04)
IMM GRANULOCYTES NFR BLD AUTO: 0.5 %
INR PPP: 1.1
INTERVENTRICULAR SEPTUM: 0.8 CM (ref 0.6–1.1)
KETONES UR QL STRIP: NEGATIVE
LEFT ATRIUM AREA SYSTOLIC (APICAL 2 CHAMBER): 44.2 CM2
LEFT ATRIUM AREA SYSTOLIC (APICAL 4 CHAMBER): 49.4 CM2
LEFT ATRIUM SIZE: 5.1 CM
LEFT ATRIUM VOLUME INDEX MOD: 98 ML/M2
LEFT ATRIUM VOLUME MOD: 222 ML
LEFT INTERNAL DIMENSION IN SYSTOLE: 4.2 CM (ref 2.1–4)
LEFT VENTRICLE DIASTOLIC VOLUME INDEX: 72.69 ML/M2
LEFT VENTRICLE DIASTOLIC VOLUME: 165 ML
LEFT VENTRICLE END DIASTOLIC VOLUME APICAL 2 CHAMBER: 130 ML
LEFT VENTRICLE END DIASTOLIC VOLUME APICAL 4 CHAMBER: 170 ML
LEFT VENTRICLE END SYSTOLIC VOLUME APICAL 2 CHAMBER: 202 ML
LEFT VENTRICLE END SYSTOLIC VOLUME APICAL 4 CHAMBER: 223 ML
LEFT VENTRICLE MASS INDEX: 102.7 G/M2
LEFT VENTRICLE SYSTOLIC VOLUME INDEX: 34.4 ML/M2
LEFT VENTRICLE SYSTOLIC VOLUME: 78 ML
LEFT VENTRICULAR INTERNAL DIMENSION IN DIASTOLE: 5.8 CM (ref 3.5–6)
LEFT VENTRICULAR MASS: 233.1 G
LEUKOCYTE ESTERASE UR QL STRIP: NEGATIVE
LVED V (TEICH): 165 ML
LVES V (TEICH): 77.7 ML
LVOT MG: 2 MMHG
LVOT MV: 0.69 CM/S
LYMPHOCYTES # BLD AUTO: 0.73 X10(3)/MCL (ref 0.6–4.6)
LYMPHOCYTES NFR BLD AUTO: 19.4 %
MCH RBC QN AUTO: 30.4 PG (ref 27–31)
MCHC RBC AUTO-ENTMCNC: 32.2 G/DL (ref 33–36)
MCV RBC AUTO: 94.5 FL (ref 80–94)
MONOCYTES # BLD AUTO: 0.57 X10(3)/MCL (ref 0.1–1.3)
MONOCYTES NFR BLD AUTO: 15.2 %
MR PISA EROA: 0.19 CM2
MV MEAN GRADIENT: 7 MMHG
MV PEAK GRADIENT: 17 MMHG
MV VALVE AREA BY CONTINUITY EQUATION: 1.48 CM2
NEUTROPHILS # BLD AUTO: 2.37 X10(3)/MCL (ref 2.1–9.2)
NEUTROPHILS NFR BLD AUTO: 63 %
NITRITE UR QL STRIP: NEGATIVE
NRBC BLD AUTO-RTO: 0 %
OHS LV EJECTION FRACTION SIMPSONS BIPLANE MOD: 63 %
PH UR STRIP: 6.5 [PH]
PISA MRMAX VEL: 5.05 M/S
PISA RADIUS: 0.7 CM
PISA TR MAX VEL: 3 M/S
PISA VN NYQUIST MS: 0.31 M/S
PISA VN NYQUIST: 0.31 M/S
PLATELET # BLD AUTO: 150 X10(3)/MCL (ref 130–400)
PMV BLD AUTO: 9.4 FL (ref 7.4–10.4)
POTASSIUM SERPL-SCNC: 3.8 MMOL/L (ref 3.5–5.1)
PROT SERPL-MCNC: 6.3 GM/DL (ref 5.8–7.6)
PROT UR QL STRIP: NEGATIVE
PROTHROMBIN TIME: 14.6 SECONDS (ref 12.5–14.5)
RA PRESSURE ESTIMATED: 8 MMHG
RBC # BLD AUTO: 2.89 X10(6)/MCL (ref 4.2–5.4)
RBC #/AREA URNS AUTO: NORMAL /HPF
RV TB RVSP: 11 MMHG
SINUS: 3.4 CM
SODIUM SERPL-SCNC: 132 MMOL/L (ref 136–145)
SP GR UR STRIP.AUTO: 1.01 (ref 1–1.03)
SQUAMOUS #/AREA URNS LPF: NORMAL /HPF
TR MAX PG: 36 MMHG
TV REST PULMONARY ARTERY PRESSURE: 44 MMHG
UROBILINOGEN UR STRIP-ACNC: NORMAL
WBC # BLD AUTO: 3.76 X10(3)/MCL (ref 4.5–11.5)
WBC #/AREA URNS AUTO: NORMAL /HPF
Z-SCORE OF LEFT VENTRICULAR DIMENSION IN END DIASTOLE: -3.75
Z-SCORE OF LEFT VENTRICULAR DIMENSION IN END SYSTOLE: -1.54

## 2025-06-21 PROCEDURE — 25000003 PHARM REV CODE 250

## 2025-06-21 PROCEDURE — 85610 PROTHROMBIN TIME: CPT | Performed by: HOSPITALIST

## 2025-06-21 PROCEDURE — 81001 URINALYSIS AUTO W/SCOPE: CPT | Performed by: HOSPITALIST

## 2025-06-21 PROCEDURE — 63600175 PHARM REV CODE 636 W HCPCS: Performed by: HOSPITALIST

## 2025-06-21 PROCEDURE — 11000001 HC ACUTE MED/SURG PRIVATE ROOM

## 2025-06-21 PROCEDURE — 85014 HEMATOCRIT: CPT

## 2025-06-21 PROCEDURE — 51798 US URINE CAPACITY MEASURE: CPT

## 2025-06-21 PROCEDURE — 25000003 PHARM REV CODE 250: Performed by: HOSPITALIST

## 2025-06-21 PROCEDURE — 63600175 PHARM REV CODE 636 W HCPCS: Performed by: NURSE PRACTITIONER

## 2025-06-21 PROCEDURE — 80053 COMPREHEN METABOLIC PANEL: CPT

## 2025-06-21 PROCEDURE — 85025 COMPLETE CBC W/AUTO DIFF WBC: CPT

## 2025-06-21 PROCEDURE — 25000003 PHARM REV CODE 250: Performed by: NURSE PRACTITIONER

## 2025-06-21 PROCEDURE — 36415 COLL VENOUS BLD VENIPUNCTURE: CPT | Performed by: HOSPITALIST

## 2025-06-21 PROCEDURE — 36415 COLL VENOUS BLD VENIPUNCTURE: CPT

## 2025-06-21 RX ORDER — METHOCARBAMOL 500 MG/1
500 TABLET, FILM COATED ORAL 3 TIMES DAILY
Status: DISCONTINUED | OUTPATIENT
Start: 2025-06-21 | End: 2025-06-25 | Stop reason: HOSPADM

## 2025-06-21 RX ORDER — FUROSEMIDE 10 MG/ML
40 INJECTION INTRAMUSCULAR; INTRAVENOUS DAILY
Status: DISCONTINUED | OUTPATIENT
Start: 2025-06-22 | End: 2025-06-23

## 2025-06-21 RX ORDER — KETOROLAC TROMETHAMINE 30 MG/ML
15 INJECTION, SOLUTION INTRAMUSCULAR; INTRAVENOUS EVERY 6 HOURS PRN
Status: DISPENSED | OUTPATIENT
Start: 2025-06-21 | End: 2025-06-24

## 2025-06-21 RX ORDER — METOPROLOL TARTRATE 25 MG/1
25 TABLET, FILM COATED ORAL 2 TIMES DAILY
Status: DISCONTINUED | OUTPATIENT
Start: 2025-06-21 | End: 2025-06-25 | Stop reason: HOSPADM

## 2025-06-21 RX ORDER — PANTOPRAZOLE SODIUM 40 MG/10ML
40 INJECTION, POWDER, LYOPHILIZED, FOR SOLUTION INTRAVENOUS DAILY
Status: DISCONTINUED | OUTPATIENT
Start: 2025-06-21 | End: 2025-06-25 | Stop reason: HOSPADM

## 2025-06-21 RX ORDER — HYDROCORTISONE ACETATE 25 MG/1
25 SUPPOSITORY RECTAL 2 TIMES DAILY
Status: DISCONTINUED | OUTPATIENT
Start: 2025-06-21 | End: 2025-06-25 | Stop reason: HOSPADM

## 2025-06-21 RX ADMIN — Medication 6 MG: at 09:06

## 2025-06-21 RX ADMIN — METHOCARBAMOL 500 MG: 500 TABLET ORAL at 09:06

## 2025-06-21 RX ADMIN — KETOROLAC TROMETHAMINE 15 MG: 30 INJECTION, SOLUTION INTRAMUSCULAR; INTRAVENOUS at 09:06

## 2025-06-21 RX ADMIN — METHOCARBAMOL 500 MG: 500 TABLET ORAL at 03:06

## 2025-06-21 RX ADMIN — PANTOPRAZOLE SODIUM 40 MG: 40 INJECTION, POWDER, FOR SOLUTION INTRAVENOUS at 11:06

## 2025-06-21 RX ADMIN — HYDROCORTISONE ACETATE 25 MG: 25 SUPPOSITORY RECTAL at 09:06

## 2025-06-21 RX ADMIN — LEVOTHYROXINE SODIUM 125 MCG: 0.12 TABLET ORAL at 08:06

## 2025-06-21 RX ADMIN — MORPHINE SULFATE 4 MG: 4 INJECTION, SOLUTION INTRAMUSCULAR; INTRAVENOUS at 06:06

## 2025-06-21 RX ADMIN — BUPROPION HYDROCHLORIDE 100 MG: 100 TABLET, FILM COATED, EXTENDED RELEASE ORAL at 09:06

## 2025-06-21 RX ADMIN — HYDROCORTISONE ACETATE 25 MG: 25 SUPPOSITORY RECTAL at 01:06

## 2025-06-21 RX ADMIN — METOPROLOL TARTRATE 25 MG: 25 TABLET, FILM COATED ORAL at 09:06

## 2025-06-21 RX ADMIN — BUPROPION HYDROCHLORIDE 100 MG: 100 TABLET, FILM COATED, EXTENDED RELEASE ORAL at 08:06

## 2025-06-21 RX ADMIN — METOPROLOL TARTRATE 25 MG: 25 TABLET, FILM COATED ORAL at 11:06

## 2025-06-21 RX ADMIN — Medication 6 MG: at 01:06

## 2025-06-21 RX ADMIN — HYDROCODONE BITARTRATE AND ACETAMINOPHEN 1 TABLET: 5; 325 TABLET ORAL at 01:06

## 2025-06-21 NOTE — PROGRESS NOTES
[facility] Hospital Medicine Progress Note        Chief Complaint: Inpatient Follow-up for     HPI:   65-year-old female with a past medical history of congestive heart failure, atrial fibrillation, COPD, GERD, HLD, HTN presented to the ED with shortness of breath and worsening lower extremity swelling bilaterally as well as muscle spasms. Patient reports associated frequent falling but has not fallen since being discharged from [facility] after being seen on 06/10/2025. Patient was seen at [facility] for frequent falling, lightheadedness, problems with balance and was treated for hypokalemia and hyponatremia. She was then discharged and was taken off of her Lasix by the Nephrology team there due to concerns with her kidneys. She then developed worsening shortness of breath and lower extremity swelling over the past week and although she restarted her Lasix on her own her symptoms did not improve. She also reports that the associated muscle spasms are primarily around her rectum and that she has never had these before. She also reports some associated BRBPR and difficulty having bowel movements.   Interval Hx:   ON:  MRI lumbar spine done.   She was having some soft blood pressures so  lasix held.  Complaining of pain in back and rectum and legs.    She  was incontinent of urine.      Case was discussed with patient's nurse and  on the floor.    Objective/physical exam:  General: In no acute distress, afebrile  Chest: Clear to auscultation bilaterally, 2 L NC   Heart  tachcardia   Abdomen: Soft, nontender, BS +  MSK: Warm, no lower extremity edema, no clubbing or cyanosis  Neurologic: Alert and oriented x4, Cranial nerve II-XII intact, Strength 5/5 in all 4 extremities    VITAL SIGNS: 24 HRS MIN & MAX LAST   Temp  Min: 97.8 °F (36.6 °C)  Max: 98.4 °F (36.9 °C) 98 °F (36.7 °C)   BP  Min: 96/51  Max: 158/69 (!) 96/51   Pulse  Min: 94  Max: 114  (!) 111   Resp  Min: 17  Max:  26 19   SpO2  Min: 92 %  Max: 99 % (!) 93 %     I have reviewed the following labs:  Recent Labs   Lab 06/20/25  1014 06/20/25  1641 06/21/25  0023 06/21/25  0351 06/21/25  0819   WBC 4.42*  --   --  3.76*  --    RBC 3.33*  --   --  2.89*  --    HGB 10.2*   < > 9.5* 8.8* 10.3*   HCT 30.4*   < > 29.1* 27.3* 31.7*   MCV 91.3  --   --  94.5*  --    MCH 30.6  --   --  30.4  --    MCHC 33.6  --   --  32.2*  --    RDW 18.4*  --   --  18.5*  --      --   --  150  --    MPV 9.2  --   --  9.4  --     < > = values in this interval not displayed.     Recent Labs   Lab 06/20/25  1014 06/21/25  0351   * 132*   K 4.7 3.8   CL 94* 96*   CO2 28 29   BUN 8.8* 7.5*   CREATININE 0.73 0.61   * 97   CALCIUM 9.5 8.8   MG 1.90  --    ALBUMIN 3.5 2.9*   PROT 7.6 6.3   ALKPHOS 245* 187*   ALT 20 16   AST 23 15   BILITOT 0.9 0.8     Microbiology Results (last 7 days)       ** No results found for the last 168 hours. **             See below for Radiology    Assessment/Plan:  HF pEF with LE edema   Soft BP   Hyponatremia, chronic   LE leg pain   Rectum spasm and low back pain   COPD   A fib   BRB suspected from rectum    - Lasix iv 40q d as tolerated.    Start low dose metoprolol  for HR control , tele   - GI consulted, PPI qd.  No BM noted   - UA pending    - valium for anxiety and spasm  last night however she is having hypotension.    Norco 5, toradol PRN   - PT/OT       VTE prophylaxis:  Scd     Patient condition:  Stable/Fair/Guarded/ Serious/ Critical    Anticipated discharge and Disposition:         All diagnosis and differential diagnosis have been reviewed; assessment and plan has been documented; I have personally reviewed the labs and test results that are presently available; I have reviewed the patients medication list; I have reviewed the consulting providers response and recommendations. I have reviewed or attempted to review medical records based upon their availability    All of the patient's questions  have been  addressed and answered. Patient's is agreeable to the above stated plan. I will continue to monitor closely and make adjustments to medical management as needed.    Portions of this note dictated using EMR integrated voice recognition software, and may be subject to voice recognition errors not corrected at proofreading. Please contact writer for clarification if needed.   _____________________________________________________________________    Malnutrition Status:  Nutrition consulted. Most recent weight and BMI monitored-     Measurements:  Wt Readings from Last 1 Encounters:   06/20/25 122.5 kg (270 lb)   Body mass index is 43.58 kg/m².    Patient has been screened and assessed by RD.    Malnutrition Type:  Context:    Level:      Malnutrition Characteristic Summary:       Interventions/Recommendations (treatment strategy):        Scheduled Med:   albuterol  2.5 mg Nebulization Q8H    buPROPion  100 mg Oral BID    furosemide (LASIX) injection  40 mg Intravenous Q12H    levothyroxine  125 mcg Oral Daily      Continuous Infusions:     PRN Meds:    Current Facility-Administered Medications:     dextrose 50%, 12.5 g, Intravenous, PRN    dextrose 50%, 25 g, Intravenous, PRN    diazePAM, 5 mg, Oral, Q8H PRN    glucagon (human recombinant), 1 mg, Intramuscular, PRN    glucose, 16 g, Oral, PRN    glucose, 24 g, Oral, PRN    HYDROcodone-acetaminophen, 1 tablet, Oral, Q4H PRN    melatonin, 6 mg, Oral, Nightly PRN    morphine, 4 mg, Intravenous, Q4H PRN    naloxone, 0.02 mg, Intravenous, PRN    sodium chloride 0.9%, 10 mL, Intravenous, Q12H PRN     Radiology:  I have personally reviewed the following imaging and agree with the radiologist.     Echo    Left Ventricle: The left ventricle is normal in size. Normal wall   thickness. There is normal systolic function with a visually estimated   ejection fraction of 55 - 60%. Unable to assess diastolic function due to   atrial fibrillation.    Right Ventricle: Right  ventricle was not well visualized due to poor   acoustic window.    Left Atrium: The left atrium is severely dilated    Right Atrium: The right atrium is dilated.    Aortic Valve: There is aortic valve sclerosis.    Mitral Valve: There is mitral annular calcification. There is moderate   regurgitation.    Tricuspid Valve: There is mild to moderate regurgitation.    Pulmonary Artery: There is mild pulmonary hypertension. The estimated   pulmonary artery systolic pressure is 44 mmHg.    IVC/SVC: Intermediate venous pressure at 8 mmHg.    Pericardium: There is no pericardial effusion.      Ashlee Stiles MD  Department of Hospital Medicine   [facility]   06/21/2025

## 2025-06-21 NOTE — CONSULTS
"Consult Note    Reason for Consult:      We were consulted by Dr. Stiles to evaluate this patient for BRBPR and anemia.       HPI:     65 year old female unknown to our group with a PMHx of arthritis, afib s/p watchman procedure, CHF, COPD on 2 L home O2, depression, GERD, HLD, HTN, morbid obesity (BMI 43), Sleep apnea, and thyroid disease who presented to the ER 6/20/25 complaining of shortness of breath and worsening bilateral LE edema with muscle spasms.  Of note, [place] [facility] [place] with hypokalemia and hyponatremia where she was taken off of her lasix by nephrology due to concerns for worsening kidney function.  She restarted her lasix at home on her own for her SOB and bilateral LE edema.  Reports muscle spasms are worse in her rectum and admits to BRBPR and difficulty having bowel movements.      On arrival to the ER, afebrile, tachycardic with  bpm,but otherwise HDS.  Labs significant for H&H 10.2 / 30.4, iron profile wnl.  Na 128, chloride 94, , , TSH 5.3.  Xray sacrum and coccyx negative for acute fracture or dislocation.  CXRay with possible pulmonary vascular congestion.  Abdominal xray non-diagnostic given patient's body habitus.   Echo with EF 55-60%, left atrium severely dilated, right atrium dilated, AS, moderate MR, mild to moderate TR, mild pulmonary htn, no pericardial effusion.  Patient was subsequently admitted to  for further management.  GI consulted for BRBPR and anemia.      Repeat H&H this morning 10.3 / 31.7  Patient is somewhat of a difficult historian, her 2 sisters are at the bedside and daughter on the phone who assist with history.    Last colonoscopy was in 2018 / 2019 with Dr. Aviles.  She had several polyps removed and recommended a 5 year recall.    She reportedly had an EGD "many years ago" for melena and was told she had stomach ulcers and placed on PPI therapy.  She only takes asa 81 mg, no other antiplatelets or blood " thinners.  She denies any abdominal pain, nausea, or vomiting.  Continues to report spasms in her lower back that radiate down into her rectum following multiple falls over the last few weeks.  1 episode of  BRBPR per patient's sister.  No evidence of obvious hemorrhoids on rectal exam.  Some mild bruising noted to left buttocks and posterior right hip / thigh.  Denies history of hemorrhoids and further denies any issues with constipation.  She reports having diarrhea 3 days in a row last week so she took some Imodium and has not had a BM since Tuesday.     Denies family history of GI neoplasia.  Reports decreased appetite; however, she was recently placed on Wegovi in an attempt to lose weight in hopes of improving her respiratory status.     PCP:  Abbi Jacobsen MD    Review of patient's allergies indicates:  No Known Allergies     Current Medications[1]  Prescriptions Prior to Admission[2]    Past Medical History:  Past Medical History:   Diagnosis Date    Arthritis     Atrial fibrillation     Cellulitis and abscess of leg     CHF (congestive heart failure)     Claustrophobia     COPD (chronic obstructive pulmonary disease)     Depression     GERD (gastroesophageal reflux disease)     Hyperlipidemia     Hypertension     Obesity     Oxygen dependent     2.5L    Sleep apnea     does not use CPAP    Thyroid disease       Past Surgical History:  Past Surgical History:   Procedure Laterality Date    CARDIAC CATHETERIZATION      CLOSURE OF LEFT ATRIAL APPENDAGE USING DEVICE N/A 12/13/2022    Procedure: CLOSURE, LEFT ATRIAL APPENDAGE, USING DEVICE;  Surgeon: Ronny Leyva MD;  Location: [facility] CATH LAB;  Service: Cardiology;  Laterality: N/A;  WATCHMAN W/ INTRA OP CHRIS    ECHOCARDIOGRAM,TRANSESOPHAGEAL N/A 12/13/2022    Procedure: ECHOCARDIOGRAM,TRANSESOPHAGEAL;  Surgeon: Brandon Diagnostic Provider;  Location: [facility] CATH LAB;  Service: Cardiology;  Laterality: N/A;    HYSTERECTOMY      TRANSESOPHAGEAL ECHOCARDIOGRAPHY         Family History:  No family history on file.  Social History:  Social History     Tobacco Use    Smoking status: Former     Current packs/day: 0.00     Types: Cigarettes     Quit date: 2010     Years since quitting: 15.4    Smokeless tobacco: Never   Substance Use Topics    Alcohol use: Yes     Alcohol/week: 3.0 standard drinks of alcohol     Types: 3 Glasses of wine per week       Review of Systems:     Review of Systems   Constitutional:  Positive for activity change, appetite change and fatigue. Negative for unexpected weight change.   HENT:  Negative for trouble swallowing.    Respiratory:  Positive for shortness of breath.    Cardiovascular:  Positive for leg swelling. Negative for chest pain.   Gastrointestinal:  Positive for anal bleeding (1 episode) and rectal pain. Negative for abdominal distention, abdominal pain, constipation, diarrhea, nausea and vomiting.   Musculoskeletal:  Positive for back pain.   Neurological:  Negative for dizziness, weakness and light-headedness.       Objective:     VITAL SIGNS: 24 HR MIN & MAX LAST    Temp  Min: 97.8 °F (36.6 °C)  Max: 98.3 °F (36.8 °C)  98 °F (36.7 °C)        BP  Min: 96/51  Max: 158/69  (!) 96/51     Pulse  Min: 94  Max: 112  (!) 111     Resp  Min: 17  Max: 26  19    SpO2  Min: 92 %  Max: 98 %  (!) 93 %        Intake/Output Summary (Last 24 hours) at 6/21/2025 1010  Last data filed at 6/21/2025 0600  Gross per 24 hour   Intake 120 ml   Output 800 ml   Net -680 ml       Physical Exam  Constitutional:       General: She is not in acute distress.     Appearance: She is obese. She is ill-appearing (chronically).   HENT:      Head: Normocephalic and atraumatic.      Mouth/Throat:      Mouth: Mucous membranes are dry.   Eyes:      Extraocular Movements: Extraocular movements intact.      Pupils: Pupils are equal, round, and reactive to light.   Cardiovascular:      Rate and Rhythm: Normal rate and regular rhythm.      Pulses: Normal pulses.      Heart sounds:  Normal heart sounds.   Pulmonary:      Effort: Pulmonary effort is normal.      Breath sounds: Normal breath sounds.      Comments: 2 L NC  Abdominal:      General: Bowel sounds are normal. There is no distension.      Palpations: Abdomen is soft.      Tenderness: There is no abdominal tenderness. There is no guarding.   Musculoskeletal:      Right lower leg: Edema present.      Left lower leg: Edema present.   Skin:     General: Skin is warm and dry.      Findings: Erythema (bilateral LE) present.   Neurological:      Mental Status: She is alert and oriented to person, place, and time.   Psychiatric:         Mood and Affect: Mood normal.         Behavior: Behavior normal.           Recent Results (from the past 48 hours)   Brain natriuretic peptide    Collection Time: 06/20/25 10:14 AM   Result Value Ref Range    Natriuretic Peptide 130.8 (H) <=100.0 pg/mL   Comprehensive metabolic panel    Collection Time: 06/20/25 10:14 AM   Result Value Ref Range    Sodium 128 (L) 136 - 145 mmol/L    Potassium 4.7 3.5 - 5.1 mmol/L    Chloride 94 (L) 98 - 107 mmol/L    CO2 28 23 - 31 mmol/L    Glucose 121 (H) 82 - 115 mg/dL    Blood Urea Nitrogen 8.8 (L) 9.8 - 20.1 mg/dL    Creatinine 0.73 0.55 - 1.02 mg/dL    Calcium 9.5 8.4 - 10.2 mg/dL    Protein Total 7.6 5.8 - 7.6 gm/dL    Albumin 3.5 3.4 - 4.8 g/dL    Globulin 4.1 (H) 2.4 - 3.5 gm/dL    Albumin/Globulin Ratio 0.9 (L) 1.1 - 2.0 ratio    Bilirubin Total 0.9 <=1.5 mg/dL     (H) 40 - 150 unit/L    ALT 20 0 - 55 unit/L    AST 23 11 - 45 unit/L    eGFR >60 mL/min/1.73/m2    Anion Gap 6.0 mEq/L    BUN/Creatinine Ratio 12    Magnesium    Collection Time: 06/20/25 10:14 AM   Result Value Ref Range    Magnesium Level 1.90 1.60 - 2.60 mg/dL   Troponin I    Collection Time: 06/20/25 10:14 AM   Result Value Ref Range    Troponin-I <0.010 0.000 - 0.045 ng/mL   CBC with Differential    Collection Time: 06/20/25 10:14 AM   Result Value Ref Range    WBC 4.42 (L) 4.50 - 11.50  x10(3)/mcL    RBC 3.33 (L) 4.20 - 5.40 x10(6)/mcL    Hgb 10.2 (L) 12.0 - 16.0 g/dL    Hct 30.4 (L) 37.0 - 47.0 %    MCV 91.3 80.0 - 94.0 fL    MCH 30.6 27.0 - 31.0 pg    MCHC 33.6 33.0 - 36.0 g/dL    RDW 18.4 (H) 11.5 - 17.0 %    Platelet 176 130 - 400 x10(3)/mcL    MPV 9.2 7.4 - 10.4 fL    Neut % 74.9 %    Lymph % 12.9 %    Mono % 9.7 %    Eos % 1.4 %    Basophil % 0.9 %    Imm Grans % 0.2 %    Neut # 3.31 2.1 - 9.2 x10(3)/mcL    Lymph # 0.57 (L) 0.6 - 4.6 x10(3)/mcL    Mono # 0.43 0.1 - 1.3 x10(3)/mcL    Eos # 0.06 0 - 0.9 x10(3)/mcL    Baso # 0.04 <=0.2 x10(3)/mcL    Imm Gran # 0.01 0.00 - 0.04 x10(3)/mcL    NRBC% 0.0 %   Osmolality, Serum    Collection Time: 06/20/25 10:14 AM   Result Value Ref Range    Osmolality 267 (L) 280 - 300 mOsm/kg   TSH    Collection Time: 06/20/25 10:14 AM   Result Value Ref Range    TSH 5.309 (H) 0.350 - 4.940 uIU/mL   Iron and TIBC    Collection Time: 06/20/25 10:14 AM   Result Value Ref Range    Iron Binding Capacity Unsaturated 239 70 - 310 ug/dL    Iron Level 63 50 - 170 ug/dL    Transferrin 273 173 - 360 mg/dL    Iron Binding Capacity Total 302 250 - 450 ug/dL    Iron Saturation 21 20 - 50 %   EKG 12-lead    Collection Time: 06/20/25 11:13 AM   Result Value Ref Range    QRS Duration 88 ms    OHS QTC Calculation 462 ms   Osmolality, Urine    Collection Time: 06/20/25  3:13 PM   Result Value Ref Range    Urine Osmolality 197 (L) 300 - 1,300 mOsm/kg   Sodium, Random Urine    Collection Time: 06/20/25  3:13 PM   Result Value Ref Range    Urine Sodium 45.0 mmol/L   Hemoglobin and Hematocrit    Collection Time: 06/20/25  4:41 PM   Result Value Ref Range    Hgb 11.5 (L) 12.0 - 16.0 g/dL    Hct 34.9 (L) 37.0 - 47.0 %   D-Dimer, Quantitative    Collection Time: 06/20/25  4:41 PM   Result Value Ref Range    D-Dimer 1.11 (H) 0.00 - 0.50 ug/mL FEU   Echo    Collection Time: 06/20/25  5:57 PM   Result Value Ref Range    BSA 2.39 m2    Salgado's Biplane MOD Ejection Fraction 63 %    A2C EF 59  %    A4C EF 66 %    LVOT stroke volume 60.3 cm3    LVIDd 5.8 3.5 - 6.0 cm    LV Systolic Volume 78 mL    LV Systolic Volume Index 34.4 mL/m2    LVIDs 4.2 (A) 2.1 - 4.0 cm    LV ESV A2C 202.00 mL    LV Diastolic Volume 165 mL    LV ESV A4C 223.00 mL    LV Diastolic Volume Index 72.69 mL/m2    LV EDV A2C 130 mL    LV EDV A4C 170.00 mL    Left Ventricular End Systolic Volume by Teichholz Method 77.70 mL    Left Ventricular End Diastolic Volume by Teichholz Method 165.00 mL    IVS 0.8 0.6 - 1.1 cm    LVOT diameter 2.0 cm    LVOT area 3.1 cm2    FS 27.6 (A) 28 - 44 %    Left Ventricle Relative Wall Thickness 0.41 cm    PW 1.2 (A) 0.6 - 1.1 cm    LV mass 233.1 g    LV Mass Index 102.7 g/m2    TR Max David 3.0 m/s    LVOT peak david 1.0 m/s    Left Ventricular Outflow Tract Mean Velocity 0.69 cm/s    Left Ventricular Outflow Tract Mean Gradient 2.00 mmHg    LA size 5.1 cm    LA Vol (MOD) 222 mL    PAUL (MOD) 98 mL/m2    Vn Nyquist MS 0.31 m/s    AV mean gradient 9 mmHg    AV peak gradient 16 mmHg    Ao peak david 2.0 m/s    Ao VTI 39.0 cm    LVOT peak VTI 19.2 cm    AV valve area 1.5 cm²    AV Velocity Ratio 0.50     AV index (prosthetic) 0.49     EARLINE by Velocity Ratio 1.6 cm²    Radius 0.70 cm    Vn Nyquist 0.31 m/s    Mr max david 5.05 m/s    MR PISA EROA 0.19 cm2    MV mean gradient 7 mmHg    MV peak gradient 17 mmHg    MV valve area by continuity eq 1.48 cm2    MV VTI 40.6 cm    Triscuspid Valve Regurgitation Peak Gradient 36 mmHg    ZLVIDS -1.54     ZLVIDD -3.75     LA area A4C 49.40 cm2    LA area A2C 44.20 cm2    Mitral Valve Heart Rate 79 bpm    TV resting pulmonary artery pressure 44 mmHg    RV TB RVSP 11 mmHg    Est. RA pres 8 mmHg    Sinus 3.4 cm    ASI 1.5 cm/m2   Hemoglobin and Hematocrit    Collection Time: 06/21/25 12:23 AM   Result Value Ref Range    Hgb 9.5 (L) 12.0 - 16.0 g/dL    Hct 29.1 (L) 37.0 - 47.0 %   Comprehensive Metabolic Panel (CMP)    Collection Time: 06/21/25  3:51 AM   Result Value Ref Range     Sodium 132 (L) 136 - 145 mmol/L    Potassium 3.8 3.5 - 5.1 mmol/L    Chloride 96 (L) 98 - 107 mmol/L    CO2 29 23 - 31 mmol/L    Glucose 97 82 - 115 mg/dL    Blood Urea Nitrogen 7.5 (L) 9.8 - 20.1 mg/dL    Creatinine 0.61 0.55 - 1.02 mg/dL    Calcium 8.8 8.4 - 10.2 mg/dL    Protein Total 6.3 5.8 - 7.6 gm/dL    Albumin 2.9 (L) 3.4 - 4.8 g/dL    Globulin 3.4 2.4 - 3.5 gm/dL    Albumin/Globulin Ratio 0.9 (L) 1.1 - 2.0 ratio    Bilirubin Total 0.8 <=1.5 mg/dL     (H) 40 - 150 unit/L    ALT 16 0 - 55 unit/L    AST 15 11 - 45 unit/L    eGFR >60 mL/min/1.73/m2    Anion Gap 7.0 mEq/L    BUN/Creatinine Ratio 12    CBC with Differential    Collection Time: 06/21/25  3:51 AM   Result Value Ref Range    WBC 3.76 (L) 4.50 - 11.50 x10(3)/mcL    RBC 2.89 (L) 4.20 - 5.40 x10(6)/mcL    Hgb 8.8 (L) 12.0 - 16.0 g/dL    Hct 27.3 (L) 37.0 - 47.0 %    MCV 94.5 (H) 80.0 - 94.0 fL    MCH 30.4 27.0 - 31.0 pg    MCHC 32.2 (L) 33.0 - 36.0 g/dL    RDW 18.5 (H) 11.5 - 17.0 %    Platelet 150 130 - 400 x10(3)/mcL    MPV 9.4 7.4 - 10.4 fL    Neut % 63.0 %    Lymph % 19.4 %    Mono % 15.2 %    Eos % 1.6 %    Basophil % 0.3 %    Imm Grans % 0.5 %    Neut # 2.37 2.1 - 9.2 x10(3)/mcL    Lymph # 0.73 0.6 - 4.6 x10(3)/mcL    Mono # 0.57 0.1 - 1.3 x10(3)/mcL    Eos # 0.06 0 - 0.9 x10(3)/mcL    Baso # 0.01 <=0.2 x10(3)/mcL    Imm Gran # 0.02 0.00 - 0.04 x10(3)/mcL    NRBC% 0.0 %   Protime-INR    Collection Time: 06/21/25  7:16 AM   Result Value Ref Range    PT 14.6 (H) 12.5 - 14.5 seconds    INR 1.1 <=1.3   Hemoglobin and Hematocrit    Collection Time: 06/21/25  8:19 AM   Result Value Ref Range    Hgb 10.3 (L) 12.0 - 16.0 g/dL    Hct 31.7 (L) 37.0 - 47.0 %       Echo  Result Date: 6/21/2025    Left Ventricle: The left ventricle is normal in size. Normal wall thickness. There is normal systolic function with a visually estimated ejection fraction of 55 - 60%. Unable to assess diastolic function due to atrial fibrillation.   Right Ventricle: Right  ventricle was not well visualized due to poor acoustic window.   Left Atrium: The left atrium is severely dilated   Right Atrium: The right atrium is dilated.   Aortic Valve: There is aortic valve sclerosis.   Mitral Valve: There is mitral annular calcification. There is moderate regurgitation.   Tricuspid Valve: There is mild to moderate regurgitation.   Pulmonary Artery: There is mild pulmonary hypertension. The estimated pulmonary artery systolic pressure is 44 mmHg.   IVC/SVC: Intermediate venous pressure at 8 mmHg.   Pericardium: There is no pericardial effusion.     MRI Lumbar Spine Without Contrast  Result Date: 6/20/2025  EXAMINATION: MRI LUMBAR SPINE WITHOUT CONTRAST TECHNIQUE: Low back pain, cauda equina syndrome suspected; COMPARISON: None available FINDINGS: For the purpose of this report, the most inferior well developed intervertebral disc space is presumed to represent L5-S1.  There are Schmorl node defects along the superior endplates of T12 and L1-1. Otherwise, lumbar vertebrae stature is preserved and there is no acute marrow edematous signal.  Hemangioma involves L1 vertebral body.  There is no significant listhesis.  Lumbar discs are desiccated throughout.  Visualized thoracic cord is assessed with limitations due to artifacts the conus medullaris terminates at L1.  Disc segmental analysis is given below: At L1-L2, disc is unremarkable.  Minimal facet arthropathy.  Central canal is not stenosed.  There are no narrowings of the neural foramen. At L2-L3, disc is unremarkable.  Minimal facet arthropathy.  Central canal is not stenosed and there are no narrowings of the neural foramen. At L3-L4, there is disc bulge which indents and flattens the ventral thecal sac.  Bilateral facet arthropathy and ligamentum flavum thickening.  These findings combine to cause mild to moderate central canal stenosis.  Right neural foramen is patent.  There is mild spondylotic narrowing of the left neural foramen At  L4-L5, there is generalized disc bulge, ligamentum flavum thickening and facet arthropathy causing moderate to marked central canal stenosis.  There are bilateral mild spondylotic narrowings of the neural foramen. At L5-S1, disc is unremarkable.  Bilateral facet arthropathy.  No significant central canal stenosis.  Bilateral neural foramen are patent.     Lumbar degenerative disc disease and spondylosis level by level discussed above. Electronically signed by: Wilver Lino Date:    06/20/2025 Time:    22:23    X-Ray Abdomen AP 1 View  Result Date: 6/20/2025  EXAMINATION: XR ABDOMEN AP 1 VIEW CLINICAL HISTORY: suspected obstruction; TECHNIQUE: AP View(s) of the abdomen was performed. COMPARISON: None FINDINGS: The examination is very limited due to the patient's body habitus.  No determination can be made about the intra-abdominal contents.  Repeat examination with increased penetration or CT scan correlation is recommended.     Nondiagnostic examination due to patient's body habitus Electronically signed by: Cody Hoffmann Date:    06/20/2025 Time:    19:02    X-Ray Sacrum And Coccyx  Result Date: 6/20/2025  EXAMINATION: XR SACRUM AND COCCYX CLINICAL HISTORY: Unspecified injury of lower back, initial encounter COMPARISON: None. FINDINGS: There is no definite displaced sacrococcygeal fracture identified.  The soft tissues are unremarkable.     No definite displaced fracture identified. Electronically signed by: Edel Waggoner Date:    06/20/2025 Time:    11:21    X-Ray Chest AP Portable  Result Date: 6/20/2025  EXAMINATION: XR CHEST AP PORTABLE CLINICAL HISTORY: shortness of breath; COMPARISON: 20 February 2020 FINDINGS: Frontal view of the chest was obtained. Stable cardiomegaly.  Mild interstitial prominence.  No dense consolidation or pneumothorax.     Question some pulmonary vascular congestion. Electronically signed by: Bert Mar Date:    06/20/2025 Time:    11:12      Imaging personally reviewed by myself  and SP.    Assessment / Plan:     65 year old female unknown to our group with a PMHx of arthritis, afib, CHF, COPD on 2 L home O2, depression, GERD, HLD, HTN, morbid obesity (BMI 43), Sleep apnea, and thyroid disease who presented to the ER 6/20/25 complaining of shortness of breath and worsening bilateral LE edema with muscle spasms. Admitted with acute on chronic diastolic heart failure with hypoxia and hyponatremia.  GI consulted for BRBPR and anemia.    BRBPR  - 1 episode  Acute macrocytic anemia  - Hgb 10.2--11.5--9.5--8.8--10.3  3.   Rectal pain / spasms     - Will add Anusol suppositories   - Patient may benefit from muscle relaxer - defer to primary  - Continue ppi for GI prophylaxis   -Monitor H/H and transfuse as needed to Hgb 7  -Monitor stools for bleeding  -Possibly colonoscopy Monday pending oncoming GI attending's availability and patient's clinical course over the weekend.    - Okay for regular diet today from GI perspective.  Clears to begin tomorrow pending possible colonoscopy on Monday.      Thank you for allowing us to participate in this patient's care.   Case and plan discussed with Dr. John Brandt, FNP-C         [1]   Current Facility-Administered Medications   Medication Dose Route Frequency Provider Last Rate Last Admin    albuterol nebulizer solution 2.5 mg  2.5 mg Nebulization Q8H Ashlee Stiles DO        buPROPion TBSR 12 hr tablet 100 mg  100 mg Oral BID Ashlee Stiles DO   100 mg at 06/21/25 0824    dextrose 50% injection 12.5 g  12.5 g Intravenous PRN Barrett Ramirezon T, PA-C        dextrose 50% injection 25 g  25 g Intravenous PRN FoGopi alva T, PA-C        [START ON 6/22/2025] furosemide injection 40 mg  40 mg Intravenous Daily Ashlee Stiles DO        glucagon (human recombinant) injection 1 mg  1 mg Intramuscular PRN James, Gopi T, PA-C        glucose chewable tablet 16 g  16 g Oral PRN FoBarrett alvaon T, PA-C        glucose chewable tablet 24 g  24 g  Oral PRN Gopi Ramirez PA-C        HYDROcodone-acetaminophen 5-325 mg per tablet 1 tablet  1 tablet Oral Q4H PRN Rico Lr, FNP   1 tablet at 06/21/25 0132    ketorolac injection 15 mg  15 mg Intravenous Q6H PRN Ashlee Stiles,         levothyroxine tablet 125 mcg  125 mcg Oral Daily Ashlee Stiles, DO   125 mcg at 06/21/25 0824    melatonin tablet 6 mg  6 mg Oral Nightly PRN Amirah Nicole FNP   6 mg at 06/21/25 0132    metoprolol tartrate (LOPRESSOR) tablet 25 mg  25 mg Oral BID Ashlee Stiles, DO        morphine injection 4 mg  4 mg Intravenous Q4H PRN Rico Lr FNP   4 mg at 06/21/25 0609    naloxone 0.4 mg/mL injection 0.02 mg  0.02 mg Intravenous PRN Gopi Ramirez PA-C        pantoprazole injection 40 mg  40 mg Intravenous Daily Ashlee Stiles, DO        sodium chloride 0.9% flush 10 mL  10 mL Intravenous Q12H PRN Gopi Ramirez PA-C       [2]   Medications Prior to Admission   Medication Sig Dispense Refill Last Dose/Taking    buPROPion (WELLBUTRIN SR) 100 MG TBSR 12 hr tablet Take 100 mg by mouth 2 (two) times daily.   Taking    ferrous sulfate 325 (65 FE) MG EC tablet Take 325 mg by mouth once daily.   Taking    furosemide (LASIX) 80 MG tablet Take 80 mg by mouth 2 (two) times daily as needed (swelling/weight gain).   Taking As Needed    gabapentin (NEURONTIN) 600 MG tablet Take 600 mg by mouth 2 (two) times daily.   Taking    HYDROcodone-acetaminophen (NORCO) 7.5-325 mg per tablet Take 1 tablet by mouth 2 (two) times daily as needed.   Taking As Needed    levothyroxine (SYNTHROID) 125 MCG tablet Take 125 mcg by mouth once daily.   Taking    metoprolol succinate 100 mg CSpX Take 100 mg by mouth Daily.   Taking    omeprazole (PRILOSEC) 40 MG capsule Take 40 mg by mouth 2 (two) times daily before meals.   Taking    paroxetine (PAXIL) 20 MG tablet Take 1 tablet by mouth once daily.   Taking    potassium chloride (K-TAB) 20 mEq Take 20 mEq by mouth 2  (two) times a day.   Taking    vitamin D (VITAMIN D3) 1000 units Tab Take 1,000 Units by mouth once daily.   Taking    albuterol (PROVENTIL) 2.5 mg /3 mL (0.083 %) nebulizer solution Take 2.5 mg by nebulization every 4 (four) hours as needed.       aspirin (ECOTRIN) 81 MG EC tablet Take 1 tablet (81 mg total) by mouth once daily. 90 tablet 3     clopidogreL (PLAVIX) 75 mg tablet Take 1 tablet (75 mg total) by mouth once daily. 90 tablet 3

## 2025-06-22 LAB
ALBUMIN SERPL-MCNC: 3 G/DL (ref 3.4–4.8)
ALBUMIN/GLOB SERPL: 1 RATIO (ref 1.1–2)
ALP SERPL-CCNC: 190 UNIT/L (ref 40–150)
ALT SERPL-CCNC: 13 UNIT/L (ref 0–55)
ANION GAP SERPL CALC-SCNC: 8 MEQ/L
APTT PPP: 27.9 SECONDS (ref 23.2–33.7)
AST SERPL-CCNC: 16 UNIT/L (ref 11–45)
BASOPHILS # BLD AUTO: 0.04 X10(3)/MCL
BASOPHILS NFR BLD AUTO: 1 %
BILIRUB SERPL-MCNC: 0.7 MG/DL
BUN SERPL-MCNC: 7.1 MG/DL (ref 9.8–20.1)
CALCIUM SERPL-MCNC: 8.4 MG/DL (ref 8.4–10.2)
CHLORIDE SERPL-SCNC: 97 MMOL/L (ref 98–107)
CO2 SERPL-SCNC: 27 MMOL/L (ref 23–31)
CREAT SERPL-MCNC: 0.63 MG/DL (ref 0.55–1.02)
CREAT/UREA NIT SERPL: 11
EOSINOPHIL # BLD AUTO: 0.07 X10(3)/MCL (ref 0–0.9)
EOSINOPHIL NFR BLD AUTO: 1.8 %
ERYTHROCYTE [DISTWIDTH] IN BLOOD BY AUTOMATED COUNT: 18.8 % (ref 11.5–17)
GFR SERPLBLD CREATININE-BSD FMLA CKD-EPI: >60 ML/MIN/1.73/M2
GLOBULIN SER-MCNC: 3.1 GM/DL (ref 2.4–3.5)
GLUCOSE SERPL-MCNC: 103 MG/DL (ref 82–115)
HCT VFR BLD AUTO: 29.7 % (ref 37–47)
HGB BLD-MCNC: 9.7 G/DL (ref 12–16)
IMM GRANULOCYTES # BLD AUTO: 0.01 X10(3)/MCL (ref 0–0.04)
IMM GRANULOCYTES NFR BLD AUTO: 0.3 %
LYMPHOCYTES # BLD AUTO: 0.66 X10(3)/MCL (ref 0.6–4.6)
LYMPHOCYTES NFR BLD AUTO: 17.3 %
MCH RBC QN AUTO: 31.1 PG (ref 27–31)
MCHC RBC AUTO-ENTMCNC: 32.7 G/DL (ref 33–36)
MCV RBC AUTO: 95.2 FL (ref 80–94)
MONOCYTES # BLD AUTO: 0.4 X10(3)/MCL (ref 0.1–1.3)
MONOCYTES NFR BLD AUTO: 10.5 %
NEUTROPHILS # BLD AUTO: 2.63 X10(3)/MCL (ref 2.1–9.2)
NEUTROPHILS NFR BLD AUTO: 69.1 %
NRBC BLD AUTO-RTO: 0 %
PLATELET # BLD AUTO: 132 X10(3)/MCL (ref 130–400)
PMV BLD AUTO: 9 FL (ref 7.4–10.4)
POTASSIUM SERPL-SCNC: 4.2 MMOL/L (ref 3.5–5.1)
PROT SERPL-MCNC: 6.1 GM/DL (ref 5.8–7.6)
RBC # BLD AUTO: 3.12 X10(6)/MCL (ref 4.2–5.4)
SODIUM SERPL-SCNC: 132 MMOL/L (ref 136–145)
WBC # BLD AUTO: 3.81 X10(3)/MCL (ref 4.5–11.5)

## 2025-06-22 PROCEDURE — 63600175 PHARM REV CODE 636 W HCPCS: Performed by: HOSPITALIST

## 2025-06-22 PROCEDURE — 99223 1ST HOSP IP/OBS HIGH 75: CPT | Mod: ,,, | Performed by: NEUROLOGICAL SURGERY

## 2025-06-22 PROCEDURE — 85730 THROMBOPLASTIN TIME PARTIAL: CPT | Performed by: HOSPITALIST

## 2025-06-22 PROCEDURE — 99900031 HC PATIENT EDUCATION (STAT)

## 2025-06-22 PROCEDURE — 94640 AIRWAY INHALATION TREATMENT: CPT

## 2025-06-22 PROCEDURE — 85025 COMPLETE CBC W/AUTO DIFF WBC: CPT

## 2025-06-22 PROCEDURE — 11000001 HC ACUTE MED/SURG PRIVATE ROOM

## 2025-06-22 PROCEDURE — 25000003 PHARM REV CODE 250: Performed by: HOSPITALIST

## 2025-06-22 PROCEDURE — 25000003 PHARM REV CODE 250

## 2025-06-22 PROCEDURE — 63600175 PHARM REV CODE 636 W HCPCS: Performed by: NURSE PRACTITIONER

## 2025-06-22 PROCEDURE — 27000221 HC OXYGEN, UP TO 24 HOURS

## 2025-06-22 PROCEDURE — 80053 COMPREHEN METABOLIC PANEL: CPT

## 2025-06-22 PROCEDURE — 94760 N-INVAS EAR/PLS OXIMETRY 1: CPT

## 2025-06-22 PROCEDURE — 25000242 PHARM REV CODE 250 ALT 637 W/ HCPCS: Performed by: HOSPITALIST

## 2025-06-22 PROCEDURE — 94761 N-INVAS EAR/PLS OXIMETRY MLT: CPT

## 2025-06-22 PROCEDURE — 36415 COLL VENOUS BLD VENIPUNCTURE: CPT

## 2025-06-22 PROCEDURE — 25000003 PHARM REV CODE 250: Performed by: NURSE PRACTITIONER

## 2025-06-22 PROCEDURE — 99900035 HC TECH TIME PER 15 MIN (STAT)

## 2025-06-22 RX ORDER — SODIUM, POTASSIUM,MAG SULFATES 17.5-3.13G
1 SOLUTION, RECONSTITUTED, ORAL ORAL 2 TIMES DAILY
Status: COMPLETED | OUTPATIENT
Start: 2025-06-23 | End: 2025-06-24

## 2025-06-22 RX ORDER — POLYETHYLENE GLYCOL 3350 17 G/17G
17 POWDER, FOR SOLUTION ORAL 2 TIMES DAILY
Status: DISCONTINUED | OUTPATIENT
Start: 2025-06-22 | End: 2025-06-25 | Stop reason: HOSPADM

## 2025-06-22 RX ORDER — SODIUM, POTASSIUM,MAG SULFATES 17.5-3.13G
1 SOLUTION, RECONSTITUTED, ORAL ORAL 2 TIMES DAILY
Status: DISCONTINUED | OUTPATIENT
Start: 2025-06-22 | End: 2025-06-22

## 2025-06-22 RX ADMIN — BUPROPION HYDROCHLORIDE 100 MG: 100 TABLET, FILM COATED, EXTENDED RELEASE ORAL at 08:06

## 2025-06-22 RX ADMIN — HYDROCORTISONE ACETATE 25 MG: 25 SUPPOSITORY RECTAL at 09:06

## 2025-06-22 RX ADMIN — METOPROLOL TARTRATE 25 MG: 25 TABLET, FILM COATED ORAL at 08:06

## 2025-06-22 RX ADMIN — PANTOPRAZOLE SODIUM 40 MG: 40 INJECTION, POWDER, FOR SOLUTION INTRAVENOUS at 08:06

## 2025-06-22 RX ADMIN — HYDROCODONE BITARTRATE AND ACETAMINOPHEN 1 TABLET: 5; 325 TABLET ORAL at 11:06

## 2025-06-22 RX ADMIN — ALBUTEROL SULFATE 2.5 MG: 2.5 SOLUTION RESPIRATORY (INHALATION) at 04:06

## 2025-06-22 RX ADMIN — BUPROPION HYDROCHLORIDE 100 MG: 100 TABLET, FILM COATED, EXTENDED RELEASE ORAL at 09:06

## 2025-06-22 RX ADMIN — ALBUTEROL SULFATE 2.5 MG: 2.5 SOLUTION RESPIRATORY (INHALATION) at 09:06

## 2025-06-22 RX ADMIN — ALBUTEROL SULFATE 2.5 MG: 2.5 SOLUTION RESPIRATORY (INHALATION) at 12:06

## 2025-06-22 RX ADMIN — POLYETHYLENE GLYCOL 3350 17 G: 17 POWDER, FOR SOLUTION ORAL at 09:06

## 2025-06-22 RX ADMIN — METOPROLOL TARTRATE 25 MG: 25 TABLET, FILM COATED ORAL at 09:06

## 2025-06-22 RX ADMIN — POLYETHYLENE GLYCOL 3350 17 G: 17 POWDER, FOR SOLUTION ORAL at 11:06

## 2025-06-22 RX ADMIN — FUROSEMIDE 40 MG: 10 INJECTION, SOLUTION INTRAVENOUS at 08:06

## 2025-06-22 RX ADMIN — METHOCARBAMOL 500 MG: 500 TABLET ORAL at 08:06

## 2025-06-22 RX ADMIN — KETOROLAC TROMETHAMINE 15 MG: 30 INJECTION, SOLUTION INTRAMUSCULAR; INTRAVENOUS at 01:06

## 2025-06-22 RX ADMIN — METHOCARBAMOL 500 MG: 500 TABLET ORAL at 02:06

## 2025-06-22 RX ADMIN — HYDROCORTISONE ACETATE 25 MG: 25 SUPPOSITORY RECTAL at 08:06

## 2025-06-22 RX ADMIN — METHOCARBAMOL 500 MG: 500 TABLET ORAL at 09:06

## 2025-06-22 RX ADMIN — LEVOTHYROXINE SODIUM 125 MCG: 0.12 TABLET ORAL at 08:06

## 2025-06-22 RX ADMIN — MORPHINE SULFATE 4 MG: 4 INJECTION, SOLUTION INTRAMUSCULAR; INTRAVENOUS at 01:06

## 2025-06-22 RX ADMIN — Medication 6 MG: at 09:06

## 2025-06-22 NOTE — PROGRESS NOTES
[facility] Hospital Medicine Progress Note        Chief Complaint: Inpatient Follow-up for     HPI:   65-year-old female with a past medical history of congestive heart failure, atrial fibrillation, COPD, GERD, HLD, HTN presented to the ED with shortness of breath and worsening lower extremity swelling bilaterally as well as muscle spasms. Patient reports associated frequent falling but has not fallen since being discharged from [facility] after being seen on 06/10/2025. [place] [facility] [place], lightheadedness, problems with balance and was treated for hypokalemia and hyponatremia. She was then discharged and was taken off of her Lasix by the Nephrology team there due to concerns with her kidneys. She then developed worsening shortness of breath and lower extremity swelling over the past week and although she restarted her Lasix on her own her symptoms did not improve. She also reports that the associated muscle spasms are primarily around her rectum and that she has never had these before. She also reports some associated BRBPR and difficulty having bowel movements.   Interval Hx:   6/21:  started her on robaxin and anusol suppositories.     6/22:  clear diet.  GI  started her on clear diet with hopeful plan for colonscopy tomor.  Spasm much better..  Sister and daughter at the bedside reports that she has been having increasing falls in which her legs just gives out.Her pain has worsened since her fall 1 week ago.   Pain better today but still too painful for walking     Case was discussed with patient's nurse and  on the floor.    Objective/physical exam:  General: In no acute distress, afebrile  Chest: Clear to auscultation bilaterally,  intermittent wheeze anteriorly, 2 L NC   Heart  tachcardia   Abdomen: Soft, nontender, BS +  MSK: Warm, + bilateral edema. With brawny induration. Decreased swelling from yesterday   Neurologic: Alert and  oriented x4    VITAL SIGNS: 24 HRS MIN & MAX LAST   Temp  Min: 97.5 °F (36.4 °C)  Max: 98.8 °F (37.1 °C) 98 °F (36.7 °C)   BP  Min: 104/64  Max: 126/67 117/62   Pulse  Min: 68  Max: 98  82   Resp  Min: 18  Max: 20 18   SpO2  Min: 90 %  Max: 100 % 97 %     I have reviewed the following labs:  Recent Labs   Lab 06/20/25  1014 06/20/25  1641 06/21/25  0351 06/21/25  0819 06/21/25  1150 06/22/25  0442   WBC 4.42*  --  3.76*  --   --  3.81*   RBC 3.33*  --  2.89*  --   --  3.12*   HGB 10.2*   < > 8.8* 10.3* 8.9* 9.7*   HCT 30.4*   < > 27.3* 31.7* 27.8* 29.7*   MCV 91.3  --  94.5*  --   --  95.2*   MCH 30.6  --  30.4  --   --  31.1*   MCHC 33.6  --  32.2*  --   --  32.7*   RDW 18.4*  --  18.5*  --   --  18.8*     --  150  --   --  132   MPV 9.2  --  9.4  --   --  9.0    < > = values in this interval not displayed.     Recent Labs   Lab 06/20/25  1014 06/21/25  0351 06/22/25  0442   * 132* 132*   K 4.7 3.8 4.2   CL 94* 96* 97*   CO2 28 29 27   BUN 8.8* 7.5* 7.1*   CREATININE 0.73 0.61 0.63   * 97 103   CALCIUM 9.5 8.8 8.4   MG 1.90  --   --    ALBUMIN 3.5 2.9* 3.0*   PROT 7.6 6.3 6.1   ALKPHOS 245* 187* 190*   ALT 20 16 13   AST 23 15 16   BILITOT 0.9 0.8 0.7     Microbiology Results (last 7 days)       ** No results found for the last 168 hours. **             See below for Radiology    Assessment/Plan:  HF pEF with LE edema   Soft BP   Hyponatremia, chronic   Leg discomfort due to swelling   Rectum spasm and low back pain   COPD   A fib   BRB suspected from rectum    - Lasix iv 40q d as tolerated.   Continue low dose metoprolol  for HR control , tele .   I/O.  Legs appears slight ly improved, however it really has appearance of chronic disease  - GI consulted, PPI qd.  Anusol suppositories. Clear diet and colonscopy in 1-2 days  - robaxin started 6/21.  Norco PRN   - MRI reviewed and luumbar degenerative disc and spondylosis noted.  Will ask neuro surg to review.    - PT/OT     VTE prophylaxis:  Scd      Patient condition:  Stable    Anticipated discharge and Disposition:         All diagnosis and differential diagnosis have been reviewed; assessment and plan has been documented; I have personally reviewed the labs and test results that are presently available; I have reviewed the patients medication list; I have reviewed the consulting providers response and recommendations. I have reviewed or attempted to review medical records based upon their availability    All of the patient's questions have been  addressed and answered. Patient's is agreeable to the above stated plan. I will continue to monitor closely and make adjustments to medical management as needed.    Portions of this note dictated using EMR integrated voice recognition software, and may be subject to voice recognition errors not corrected at proofreading. Please contact writer for clarification if needed.   _____________________________________________________________________    Malnutrition Status:  Nutrition consulted. Most recent weight and BMI monitored-     Measurements:  Wt Readings from Last 1 Encounters:   06/20/25 122.5 kg (270 lb)   Body mass index is 43.58 kg/m².    Patient has been screened and assessed by RD.    Malnutrition Type:  Context:    Level:      Malnutrition Characteristic Summary:       Interventions/Recommendations (treatment strategy):        Scheduled Med:   albuterol  2.5 mg Nebulization Q8H    buPROPion  100 mg Oral BID    furosemide (LASIX) injection  40 mg Intravenous Daily    hydrocortisone  25 mg Rectal BID    levothyroxine  125 mcg Oral Daily    methocarbamoL  500 mg Oral TID    metoprolol tartrate  25 mg Oral BID    pantoprazole  40 mg Intravenous Daily    polyethylene glycol  17 g Oral BID    sodium,potassium,mag sulfates  1 Bottle Oral BID      Continuous Infusions:     PRN Meds:    Current Facility-Administered Medications:     dextrose 50%, 12.5 g, Intravenous, PRN    dextrose 50%, 25 g, Intravenous, PRN     glucagon (human recombinant), 1 mg, Intramuscular, PRN    glucose, 16 g, Oral, PRN    glucose, 24 g, Oral, PRN    HYDROcodone-acetaminophen, 1 tablet, Oral, Q4H PRN    ketorolac, 15 mg, Intravenous, Q6H PRN    melatonin, 6 mg, Oral, Nightly PRN    morphine, 4 mg, Intravenous, Q4H PRN    naloxone, 0.02 mg, Intravenous, PRN    sodium chloride 0.9%, 10 mL, Intravenous, Q12H PRN     Radiology:  I have personally reviewed the following imaging and agree with the radiologist.     Echo    Left Ventricle: The left ventricle is normal in size. Normal wall   thickness. There is normal systolic function with a visually estimated   ejection fraction of 55 - 60%. Unable to assess diastolic function due to   atrial fibrillation.    Right Ventricle: Right ventricle was not well visualized due to poor   acoustic window.    Left Atrium: The left atrium is severely dilated    Right Atrium: The right atrium is dilated.    Aortic Valve: There is aortic valve sclerosis.    Mitral Valve: There is mitral annular calcification. There is moderate   regurgitation.    Tricuspid Valve: There is mild to moderate regurgitation.    Pulmonary Artery: There is mild pulmonary hypertension. The estimated   pulmonary artery systolic pressure is 44 mmHg.    IVC/SVC: Intermediate venous pressure at 8 mmHg.    Pericardium: There is no pericardial effusion.      Ashlee Stiles MD  Department of Hospital Medicine   [facility]   06/22/2025

## 2025-06-22 NOTE — PROGRESS NOTES
"Gastroenterology Progress Note    Subjective/Interval History:    No acute events overnight.  Patient reports improvement in spasms and pain with Robaxin and Anusol suppositories.  No overt GI bleeding- she has not yet had a bowel movement since her admission.  HGB largely stable at 9.7.  She denies any abdominal pain, nausea, or vomiting.    Review of Systems   Constitutional:  Positive for activity change, appetite change and fatigue. Negative for unexpected weight change.   HENT:  Negative for trouble swallowing.    Respiratory:  Positive for shortness of breath.    Cardiovascular:  Positive for leg swelling. Negative for chest pain.   Gastrointestinal:  Positive for anal bleeding (1 episode) and rectal pain. Negative for abdominal distention, abdominal pain, constipation, diarrhea, nausea and vomiting.   Musculoskeletal:  Positive for back pain.   Neurological:  Negative for dizziness, weakness and light-headedness.     Vital Signs:  /62 (BP Location: Right arm, Patient Position: Sitting)   Pulse 82   Temp 98 °F (36.7 °C) (Oral)   Resp 18   Ht 5' 6" (1.676 m)   Wt 122.5 kg (270 lb)   SpO2 97%   BMI 43.58 kg/m²   Body mass index is 43.58 kg/m².    Physical Exam  Constitutional:       General: She is not in acute distress.     Appearance: She is obese. She is ill-appearing (chronically).   HENT:      Head: Normocephalic and atraumatic.      Mouth/Throat:      Mouth: Mucous membranes are dry.   Eyes:      Extraocular Movements: Extraocular movements intact.      Pupils: Pupils are equal, round, and reactive to light.   Cardiovascular:      Rate and Rhythm: Normal rate and regular rhythm.      Pulses: Normal pulses.      Heart sounds: Normal heart sounds.   Pulmonary:      Effort: Pulmonary effort is normal.      Breath sounds: Normal breath sounds.      Comments: 2 L NC  Abdominal:      General: Bowel sounds are normal. There is no distension.      Palpations: Abdomen is soft.      Tenderness: There is " no abdominal tenderness. There is no guarding.   Musculoskeletal:      Right lower leg: Edema present.      Left lower leg: Edema present.   Skin:     General: Skin is warm and dry.      Findings: Erythema (bilateral LE) present.   Neurological:      Mental Status: She is alert and oriented to person, place, and time.   Psychiatric:         Mood and Affect: Mood normal.         Behavior: Behavior normal.     Labs:  Recent Results (from the past 48 hours)   EKG 12-lead    Collection Time: 06/20/25 11:13 AM   Result Value Ref Range    QRS Duration 88 ms    [facility] QTC Calculation 462 ms   Osmolality, Urine    Collection Time: 06/20/25  3:13 PM   Result Value Ref Range    Urine Osmolality 197 (L) 300 - 1,300 mOsm/kg   Sodium, Random Urine    Collection Time: 06/20/25  3:13 PM   Result Value Ref Range    Urine Sodium 45.0 mmol/L   Hemoglobin and Hematocrit    Collection Time: 06/20/25  4:41 PM   Result Value Ref Range    Hgb 11.5 (L) 12.0 - 16.0 g/dL    Hct 34.9 (L) 37.0 - 47.0 %   D-Dimer, Quantitative    Collection Time: 06/20/25  4:41 PM   Result Value Ref Range    D-Dimer 1.11 (H) 0.00 - 0.50 ug/mL FEU   Echo    Collection Time: 06/20/25  5:57 PM   Result Value Ref Range    BSA 2.39 m2    Salgado's Biplane MOD Ejection Fraction 63 %    A2C EF 59 %    A4C EF 66 %    LVOT stroke volume 60.3 cm3    LVIDd 5.8 3.5 - 6.0 cm    LV Systolic Volume 78 mL    LV Systolic Volume Index 34.4 mL/m2    LVIDs 4.2 (A) 2.1 - 4.0 cm    LV ESV A2C 202.00 mL    LV Diastolic Volume 165 mL    LV ESV A4C 223.00 mL    LV Diastolic Volume Index 72.69 mL/m2    LV EDV A2C 130 mL    LV EDV A4C 170.00 mL    Left Ventricular End Systolic Volume by Teichholz Method 77.70 mL    Left Ventricular End Diastolic Volume by Teichholz Method 165.00 mL    IVS 0.8 0.6 - 1.1 cm    LVOT diameter 2.0 cm    LVOT area 3.1 cm2    FS 27.6 (A) 28 - 44 %    Left Ventricle Relative Wall Thickness 0.41 cm    PW 1.2 (A) 0.6 - 1.1 cm    LV mass 233.1 g    LV Mass Index 102.7  g/m2    TR Max David 3.0 m/s    LVOT peak david 1.0 m/s    Left Ventricular Outflow Tract Mean Velocity 0.69 cm/s    Left Ventricular Outflow Tract Mean Gradient 2.00 mmHg    LA size 5.1 cm    LA Vol (MOD) 222 mL    PAUL (MOD) 98 mL/m2    Vn Nyquist MS 0.31 m/s    AV mean gradient 9 mmHg    AV peak gradient 16 mmHg    Ao peak david 2.0 m/s    Ao VTI 39.0 cm    LVOT peak VTI 19.2 cm    AV valve area 1.5 cm²    AV Velocity Ratio 0.50     AV index (prosthetic) 0.49     EARLINE by Velocity Ratio 1.6 cm²    Radius 0.70 cm    Vn Nyquist 0.31 m/s    Mr max david 5.05 m/s    MR PISA EROA 0.19 cm2    MV mean gradient 7 mmHg    MV peak gradient 17 mmHg    MV valve area by continuity eq 1.48 cm2    MV VTI 40.6 cm    Triscuspid Valve Regurgitation Peak Gradient 36 mmHg    ZLVIDS -1.54     ZLVIDD -3.75     LA area A4C 49.40 cm2    LA area A2C 44.20 cm2    Mitral Valve Heart Rate 79 bpm    TV resting pulmonary artery pressure 44 mmHg    RV TB RVSP 11 mmHg    Est. RA pres 8 mmHg    Sinus 3.4 cm    ASI 1.5 cm/m2   Hemoglobin and Hematocrit    Collection Time: 06/21/25 12:23 AM   Result Value Ref Range    Hgb 9.5 (L) 12.0 - 16.0 g/dL    Hct 29.1 (L) 37.0 - 47.0 %   Comprehensive Metabolic Panel (CMP)    Collection Time: 06/21/25  3:51 AM   Result Value Ref Range    Sodium 132 (L) 136 - 145 mmol/L    Potassium 3.8 3.5 - 5.1 mmol/L    Chloride 96 (L) 98 - 107 mmol/L    CO2 29 23 - 31 mmol/L    Glucose 97 82 - 115 mg/dL    Blood Urea Nitrogen 7.5 (L) 9.8 - 20.1 mg/dL    Creatinine 0.61 0.55 - 1.02 mg/dL    Calcium 8.8 8.4 - 10.2 mg/dL    Protein Total 6.3 5.8 - 7.6 gm/dL    Albumin 2.9 (L) 3.4 - 4.8 g/dL    Globulin 3.4 2.4 - 3.5 gm/dL    Albumin/Globulin Ratio 0.9 (L) 1.1 - 2.0 ratio    Bilirubin Total 0.8 <=1.5 mg/dL     (H) 40 - 150 unit/L    ALT 16 0 - 55 unit/L    AST 15 11 - 45 unit/L    eGFR >60 mL/min/1.73/m2    Anion Gap 7.0 mEq/L    BUN/Creatinine Ratio 12    CBC with Differential    Collection Time: 06/21/25  3:51 AM   Result  Value Ref Range    WBC 3.76 (L) 4.50 - 11.50 x10(3)/mcL    RBC 2.89 (L) 4.20 - 5.40 x10(6)/mcL    Hgb 8.8 (L) 12.0 - 16.0 g/dL    Hct 27.3 (L) 37.0 - 47.0 %    MCV 94.5 (H) 80.0 - 94.0 fL    MCH 30.4 27.0 - 31.0 pg    MCHC 32.2 (L) 33.0 - 36.0 g/dL    RDW 18.5 (H) 11.5 - 17.0 %    Platelet 150 130 - 400 x10(3)/mcL    MPV 9.4 7.4 - 10.4 fL    Neut % 63.0 %    Lymph % 19.4 %    Mono % 15.2 %    Eos % 1.6 %    Basophil % 0.3 %    Imm Grans % 0.5 %    Neut # 2.37 2.1 - 9.2 x10(3)/mcL    Lymph # 0.73 0.6 - 4.6 x10(3)/mcL    Mono # 0.57 0.1 - 1.3 x10(3)/mcL    Eos # 0.06 0 - 0.9 x10(3)/mcL    Baso # 0.01 <=0.2 x10(3)/mcL    Imm Gran # 0.02 0.00 - 0.04 x10(3)/mcL    NRBC% 0.0 %   Protime-INR    Collection Time: 06/21/25  7:16 AM   Result Value Ref Range    PT 14.6 (H) 12.5 - 14.5 seconds    INR 1.1 <=1.3   Hemoglobin and Hematocrit    Collection Time: 06/21/25  8:19 AM   Result Value Ref Range    Hgb 10.3 (L) 12.0 - 16.0 g/dL    Hct 31.7 (L) 37.0 - 47.0 %   Urinalysis, Reflex to Urine Culture Urine, Clean Catch    Collection Time: 06/21/25 11:03 AM    Specimen: Urine   Result Value Ref Range    Color, UA Yellow Yellow, Light-Yellow, Colorless, Straw, Dark-Yellow    Appearance, UA Clear Clear    Specific Gravity, UA 1.013 1.005 - 1.030    pH, UA 6.5 5.0 - 8.5    Protein, UA Negative Negative    Glucose, UA Normal Negative, Normal    Ketones, UA Negative Negative    Blood, UA Negative Negative    Bilirubin, UA Negative Negative    Urobilinogen, UA Normal 0.2, 1.0, Normal    Nitrites, UA Negative Negative    Leukocyte Esterase, UA Negative Negative    RBC, UA 0-5 None Seen, 0-2, 3-5, 0-5 /HPF    WBC, UA 0-5 None Seen, 0-2, 3-5, 0-5 /HPF    Bacteria, UA None Seen None Seen, Trace /HPF    Squamous Epithelial Cells, UA Trace None Seen, Trace /HPF   Hemoglobin and Hematocrit    Collection Time: 06/21/25 11:50 AM   Result Value Ref Range    Hgb 8.9 (L) 12.0 - 16.0 g/dL    Hct 27.8 (L) 37.0 - 47.0 %   Comprehensive Metabolic Panel  (CMP)    Collection Time: 06/22/25  4:42 AM   Result Value Ref Range    Sodium 132 (L) 136 - 145 mmol/L    Potassium 4.2 3.5 - 5.1 mmol/L    Chloride 97 (L) 98 - 107 mmol/L    CO2 27 23 - 31 mmol/L    Glucose 103 82 - 115 mg/dL    Blood Urea Nitrogen 7.1 (L) 9.8 - 20.1 mg/dL    Creatinine 0.63 0.55 - 1.02 mg/dL    Calcium 8.4 8.4 - 10.2 mg/dL    Protein Total 6.1 5.8 - 7.6 gm/dL    Albumin 3.0 (L) 3.4 - 4.8 g/dL    Globulin 3.1 2.4 - 3.5 gm/dL    Albumin/Globulin Ratio 1.0 (L) 1.1 - 2.0 ratio    Bilirubin Total 0.7 <=1.5 mg/dL     (H) 40 - 150 unit/L    ALT 13 0 - 55 unit/L    AST 16 11 - 45 unit/L    eGFR >60 mL/min/1.73/m2    Anion Gap 8.0 mEq/L    BUN/Creatinine Ratio 11    APTT    Collection Time: 06/22/25  4:42 AM   Result Value Ref Range    PTT 27.9 23.2 - 33.7 seconds   CBC with Differential    Collection Time: 06/22/25  4:42 AM   Result Value Ref Range    WBC 3.81 (L) 4.50 - 11.50 x10(3)/mcL    RBC 3.12 (L) 4.20 - 5.40 x10(6)/mcL    Hgb 9.7 (L) 12.0 - 16.0 g/dL    Hct 29.7 (L) 37.0 - 47.0 %    MCV 95.2 (H) 80.0 - 94.0 fL    MCH 31.1 (H) 27.0 - 31.0 pg    MCHC 32.7 (L) 33.0 - 36.0 g/dL    RDW 18.8 (H) 11.5 - 17.0 %    Platelet 132 130 - 400 x10(3)/mcL    MPV 9.0 7.4 - 10.4 fL    Neut % 69.1 %    Lymph % 17.3 %    Mono % 10.5 %    Eos % 1.8 %    Basophil % 1.0 %    Imm Grans % 0.3 %    Neut # 2.63 2.1 - 9.2 x10(3)/mcL    Lymph # 0.66 0.6 - 4.6 x10(3)/mcL    Mono # 0.40 0.1 - 1.3 x10(3)/mcL    Eos # 0.07 0 - 0.9 x10(3)/mcL    Baso # 0.04 <=0.2 x10(3)/mcL    Imm Gran # 0.01 0.00 - 0.04 x10(3)/mcL    NRBC% 0.0 %       Imaging:  Echo  Result Date: 6/21/2025    Left Ventricle: The left ventricle is normal in size. Normal wall thickness. There is normal systolic function with a visually estimated ejection fraction of 55 - 60%. Unable to assess diastolic function due to atrial fibrillation.   Right Ventricle: Right ventricle was not well visualized due to poor acoustic window.   Left Atrium: The left atrium is  severely dilated   Right Atrium: The right atrium is dilated.   Aortic Valve: There is aortic valve sclerosis.   Mitral Valve: There is mitral annular calcification. There is moderate regurgitation.   Tricuspid Valve: There is mild to moderate regurgitation.   Pulmonary Artery: There is mild pulmonary hypertension. The estimated pulmonary artery systolic pressure is 44 mmHg.   IVC/SVC: Intermediate venous pressure at 8 mmHg.   Pericardium: There is no pericardial effusion.     MRI Lumbar Spine Without Contrast  Result Date: 6/20/2025  EXAMINATION: MRI LUMBAR SPINE WITHOUT CONTRAST TECHNIQUE: Low back pain, cauda equina syndrome suspected; COMPARISON: None available FINDINGS: For the purpose of this report, the most inferior well developed intervertebral disc space is presumed to represent L5-S1.  There are Schmorl node defects along the superior endplates of T12 and L1-1. Otherwise, lumbar vertebrae stature is preserved and there is no acute marrow edematous signal.  Hemangioma involves L1 vertebral body.  There is no significant listhesis.  Lumbar discs are desiccated throughout.  Visualized thoracic cord is assessed with limitations due to artifacts the conus medullaris terminates at L1.  Disc segmental analysis is given below: At L1-L2, disc is unremarkable.  Minimal facet arthropathy.  Central canal is not stenosed.  There are no narrowings of the neural foramen. At L2-L3, disc is unremarkable.  Minimal facet arthropathy.  Central canal is not stenosed and there are no narrowings of the neural foramen. At L3-L4, there is disc bulge which indents and flattens the ventral thecal sac.  Bilateral facet arthropathy and ligamentum flavum thickening.  These findings combine to cause mild to moderate central canal stenosis.  Right neural foramen is patent.  There is mild spondylotic narrowing of the left neural foramen At L4-L5, there is generalized disc bulge, ligamentum flavum thickening and facet arthropathy causing  moderate to marked central canal stenosis.  There are bilateral mild spondylotic narrowings of the neural foramen. At L5-S1, disc is unremarkable.  Bilateral facet arthropathy.  No significant central canal stenosis.  Bilateral neural foramen are patent.     Lumbar degenerative disc disease and spondylosis level by level discussed above. Electronically signed by: Wilver Lino Date:    06/20/2025 Time:    22:23    X-Ray Abdomen AP 1 View  Result Date: 6/20/2025  EXAMINATION: XR ABDOMEN AP 1 VIEW CLINICAL HISTORY: suspected obstruction; TECHNIQUE: AP View(s) of the abdomen was performed. COMPARISON: None FINDINGS: The examination is very limited due to the patient's body habitus.  No determination can be made about the intra-abdominal contents.  Repeat examination with increased penetration or CT scan correlation is recommended.     Nondiagnostic examination due to patient's body habitus Electronically signed by: Cody Hoffmann Date:    06/20/2025 Time:    19:02    X-Ray Sacrum And Coccyx  Result Date: 6/20/2025  EXAMINATION: XR SACRUM AND COCCYX CLINICAL HISTORY: Unspecified injury of lower back, initial encounter COMPARISON: None. FINDINGS: There is no definite displaced sacrococcygeal fracture identified.  The soft tissues are unremarkable.     No definite displaced fracture identified. Electronically signed by: Edel Waggoner Date:    06/20/2025 Time:    11:21    X-Ray Chest AP Portable  Result Date: 6/20/2025  EXAMINATION: XR CHEST AP PORTABLE CLINICAL HISTORY: shortness of breath; COMPARISON: 20 February 2020 FINDINGS: Frontal view of the chest was obtained. Stable cardiomegaly.  Mild interstitial prominence.  No dense consolidation or pneumothorax.     Question some pulmonary vascular congestion. Electronically signed by: Bert Mar Date:    06/20/2025 Time:    11:12         Assessment/Plan:    65 year old female unknown to our group with a PMHx of arthritis, afib, CHF, COPD on 2 L home O2, depression, GERD,  HLD, HTN, morbid obesity (BMI 43), Sleep apnea, and thyroid disease who presented to the ER 6/20/25 complaining of shortness of breath and worsening bilateral LE edema with muscle spasms. Admitted with acute on chronic diastolic heart failure with hypoxia and hyponatremia.  GI consulted for BRBPR and anemia.     BRBPR  - 1 episode  Acute macrocytic anemia  - Hgb 10.2--11.5--9.5--8.8--10.3--8.9--9.7  3.   Rectal pain / spasms      - Continue Anusol suppositories and robaxin   - Daily PPI for GI prophylaxis   - Monitor H/H and transfuse as needed to Hgb 7  - Monitor stools for bleeding  - Miralax BID for constipation   - Endoscopy pending clinical course and schedule availability (Monday vs Tuesday).  Continue clear liquid diet for now.  Additional recommendations to follow later this afternoon.       Case and plan discussed with Dr. John Brandt, FNP-C

## 2025-06-22 NOTE — CONSULTS
[facility]  [facility] Consultation    Reason for Consultation:  Lumbar stenosis  Consulted by:  Hospital medicine  Date of Consultation:  06/22/2025       SUBJECTIVE:     Chief Complaint     History of Present Illness:   Patient is a 65-year-old morbidly obese and significantly medically compromised woman who has been having episodes of her legs shaking while she is walking with her walker causing her to slide to the ground.  She does have low back pain and does have lower extremity pain, numbness and weakness.  However, her pain is not indicative of radiculopathy or neurogenic claudication.  MRI of the lumbar spine was carried out 2 days ago showing mild-to-moderate stenosis at L3-4 and to a lesser degree at L4-5.  There is significant facet arthropathy at both levels.  Neurosurgical consultation was requested.    Patient Active Problem List    Diagnosis Date Noted    Persistent atrial fibrillation 11/15/2022       Past Medical History:   Diagnosis Date    Arthritis     Atrial fibrillation     Cellulitis and abscess of leg     CHF (congestive heart failure)     Claustrophobia     COPD (chronic obstructive pulmonary disease)     Depression     GERD (gastroesophageal reflux disease)     Hyperlipidemia     Hypertension     Obesity     Oxygen dependent     2.5L    Sleep apnea     does not use CPAP    Thyroid disease      Past Surgical History:   Procedure Laterality Date    CARDIAC CATHETERIZATION      CLOSURE OF LEFT ATRIAL APPENDAGE USING DEVICE N/A 12/13/2022    Procedure: CLOSURE, LEFT ATRIAL APPENDAGE, USING DEVICE;  Surgeon: Ronny Leyva MD;  Location: [facility] CATH LAB;  Service: Cardiology;  Laterality: N/A;  WATCHMAN W/ INTRA OP CHRIS    ECHOCARDIOGRAM,TRANSESOPHAGEAL N/A 12/13/2022    Procedure: ECHOCARDIOGRAM,TRANSESOPHAGEAL;  Surgeon: Brandon Diagnostic Provider;  Location: [facility] CATH LAB;  Service: Cardiology;  Laterality: N/A;    HYSTERECTOMY      TRANSESOPHAGEAL ECHOCARDIOGRAPHY    "    Prescriptions Prior to Admission[1]  Review of patient's allergies indicates:  No Known Allergies  Social History     Tobacco Use    Smoking status: Former     Current packs/day: 0.00     Types: Cigarettes     Quit date: 2010     Years since quitting: 15.4    Smokeless tobacco: Never   Substance Use Topics    Alcohol use: Yes     Alcohol/week: 3.0 standard drinks of alcohol     Types: 3 Glasses of wine per week     No family history on file.    Vital Signs  Temp: 98.4 °F (36.9 °C)  Temp Source: Oral  Pulse: 71  Resp: 20  SpO2: 95 %  Pulse Oximetry Type: Continuous  Flow (L/min) (Oxygen Therapy): 3  Device (Oxygen Therapy): nasal cannula  BP: 124/71  BP Location: Right arm  BP Method: Automatic  Patient Position: Lying  Arousal Level: opens eyes spontaneously  Height and Weight  Height: 5' 6" (167.6 cm)  Height Method: Stated  Weight: 122.5 kg (270 lb)  Weight Method: Standard Scale  BSA (Calculated - sq m): 2.39 sq meters  BMI (Calculated): 43.6  Weight in (lb) to have BMI = 25: 154.6]    ROS:  Review of Systems    OBJECTIVE:     Vital signs in last 24 hours:  Temp:  [98.6 °F (37 °C)] 98.6 °F (37 °C)  Pulse:  [49-89] 49  Resp:  [13-18] 14  SpO2:  [96 %-99 %] 99 %  BP: (128-162)/(68-93) 128/86    On examination she is a pleasant morbidly obese woman with marked lower extremity edema and venous stasis, etc. changes.  Motor and reflex examination is limited due to her body habitus and deconditioning.  Lifting her legs also does cause low back pain so her examination is also pain modified.     ASSESSMENT/PLAN:     Problem List Items Addressed This Visit    None  Visit Diagnoses         Acute on chronic diastolic congestive heart failure    -  Primary      Tailbone injury        Relevant Orders    X-Ray Sacrum And Coccyx (Completed)      Shortness of breath        Relevant Orders    EKG 12-lead (Completed)      Acute on chronic respiratory failure with hypoxia          Hypervolemia, unspecified hypervolemia type     "      Hyponatremia          Frequent falls          Acute midline low back pain without sciatica          Chest pain        Relevant Orders    EKG 12-lead      CHF (congestive heart failure)        Relevant Orders    Echo (Completed)      Heart failure        Relevant Orders    Echo      Hematochezia        Relevant Orders    Case Request Endoscopy: COLONOSCOPY (Completed)        IMPRESSION:   1. Lumbar stenosis without neurogenic claudication or radiculopathy  2.  Likely peripheral neuropathy  3. Morbid obesity and other significant medical comorbidities     RECOMMENDATIONS:   1. No neurosurgical intervention indicated at this point   2. After discharge if her back pain is a significant problem, she could be referred to the interventional pain department, possibly for epidurals and/or facet injections, but I really do not envision her going that route.  3.  No neurosurgical follow-up needed.    Kwadwo Flores MD FACS FAANS  [facility]         [1]   Medications Prior to Admission   Medication Sig Dispense Refill Last Dose/Taking    buPROPion (WELLBUTRIN SR) 100 MG TBSR 12 hr tablet Take 100 mg by mouth 2 (two) times daily.   Taking    ferrous sulfate 325 (65 FE) MG EC tablet Take 325 mg by mouth once daily.   Taking    furosemide (LASIX) 80 MG tablet Take 80 mg by mouth 2 (two) times daily as needed (swelling/weight gain).   Taking As Needed    gabapentin (NEURONTIN) 600 MG tablet Take 600 mg by mouth 2 (two) times daily.   Taking    HYDROcodone-acetaminophen (NORCO) 7.5-325 mg per tablet Take 1 tablet by mouth 2 (two) times daily as needed.   Taking As Needed    levothyroxine (SYNTHROID) 125 MCG tablet Take 125 mcg by mouth once daily.   Taking    metoprolol succinate 100 mg CSpX Take 100 mg by mouth Daily.   Taking    omeprazole (PRILOSEC) 40 MG capsule Take 40 mg by mouth 2 (two) times daily before meals.   Taking    paroxetine (PAXIL) 20 MG tablet Take 1 tablet by mouth once daily.   Taking    potassium  chloride (K-TAB) 20 mEq Take 20 mEq by mouth 2 (two) times a day.   Taking    vitamin D (VITAMIN D3) 1000 units Tab Take 1,000 Units by mouth once daily.   Taking    albuterol (PROVENTIL) 2.5 mg /3 mL (0.083 %) nebulizer solution Take 2.5 mg by nebulization every 4 (four) hours as needed.       aspirin (ECOTRIN) 81 MG EC tablet Take 1 tablet (81 mg total) by mouth once daily. 90 tablet 3     clopidogreL (PLAVIX) 75 mg tablet Take 1 tablet (75 mg total) by mouth once daily. 90 tablet 3

## 2025-06-23 LAB
ALBUMIN SERPL-MCNC: 2.8 G/DL (ref 3.4–4.8)
ALBUMIN/GLOB SERPL: 0.8 RATIO (ref 1.1–2)
ALP SERPL-CCNC: 191 UNIT/L (ref 40–150)
ALT SERPL-CCNC: 12 UNIT/L (ref 0–55)
ANION GAP SERPL CALC-SCNC: 5 MEQ/L
AST SERPL-CCNC: 13 UNIT/L (ref 11–45)
BASOPHILS # BLD AUTO: 0.03 X10(3)/MCL
BASOPHILS NFR BLD AUTO: 0.8 %
BILIRUB SERPL-MCNC: 0.7 MG/DL
BUN SERPL-MCNC: 8.8 MG/DL (ref 9.8–20.1)
CALCIUM SERPL-MCNC: 8.7 MG/DL (ref 8.4–10.2)
CHLORIDE SERPL-SCNC: 95 MMOL/L (ref 98–107)
CO2 SERPL-SCNC: 31 MMOL/L (ref 23–31)
CREAT SERPL-MCNC: 0.62 MG/DL (ref 0.55–1.02)
CREAT/UREA NIT SERPL: 14
EOSINOPHIL # BLD AUTO: 0.11 X10(3)/MCL (ref 0–0.9)
EOSINOPHIL NFR BLD AUTO: 2.8 %
ERYTHROCYTE [DISTWIDTH] IN BLOOD BY AUTOMATED COUNT: 18.6 % (ref 11.5–17)
GFR SERPLBLD CREATININE-BSD FMLA CKD-EPI: >60 ML/MIN/1.73/M2
GLOBULIN SER-MCNC: 3.6 GM/DL (ref 2.4–3.5)
GLUCOSE SERPL-MCNC: 100 MG/DL (ref 82–115)
HCT VFR BLD AUTO: 29.4 % (ref 37–47)
HGB BLD-MCNC: 9.6 G/DL (ref 12–16)
IMM GRANULOCYTES # BLD AUTO: 0.01 X10(3)/MCL (ref 0–0.04)
IMM GRANULOCYTES NFR BLD AUTO: 0.3 %
LYMPHOCYTES # BLD AUTO: 0.69 X10(3)/MCL (ref 0.6–4.6)
LYMPHOCYTES NFR BLD AUTO: 17.6 %
MCH RBC QN AUTO: 31.2 PG (ref 27–31)
MCHC RBC AUTO-ENTMCNC: 32.7 G/DL (ref 33–36)
MCV RBC AUTO: 95.5 FL (ref 80–94)
MONOCYTES # BLD AUTO: 0.39 X10(3)/MCL (ref 0.1–1.3)
MONOCYTES NFR BLD AUTO: 10 %
NEUTROPHILS # BLD AUTO: 2.68 X10(3)/MCL (ref 2.1–9.2)
NEUTROPHILS NFR BLD AUTO: 68.5 %
NRBC BLD AUTO-RTO: 0 %
PLATELET # BLD AUTO: 136 X10(3)/MCL (ref 130–400)
PMV BLD AUTO: 8.9 FL (ref 7.4–10.4)
POTASSIUM SERPL-SCNC: 3.6 MMOL/L (ref 3.5–5.1)
PROT SERPL-MCNC: 6.4 GM/DL (ref 5.8–7.6)
RBC # BLD AUTO: 3.08 X10(6)/MCL (ref 4.2–5.4)
SODIUM SERPL-SCNC: 131 MMOL/L (ref 136–145)
WBC # BLD AUTO: 3.91 X10(3)/MCL (ref 4.5–11.5)

## 2025-06-23 PROCEDURE — 99900035 HC TECH TIME PER 15 MIN (STAT)

## 2025-06-23 PROCEDURE — 94760 N-INVAS EAR/PLS OXIMETRY 1: CPT

## 2025-06-23 PROCEDURE — 11000001 HC ACUTE MED/SURG PRIVATE ROOM

## 2025-06-23 PROCEDURE — 25000003 PHARM REV CODE 250

## 2025-06-23 PROCEDURE — 63600175 PHARM REV CODE 636 W HCPCS: Performed by: HOSPITALIST

## 2025-06-23 PROCEDURE — 36415 COLL VENOUS BLD VENIPUNCTURE: CPT

## 2025-06-23 PROCEDURE — 25000003 PHARM REV CODE 250: Performed by: HOSPITALIST

## 2025-06-23 PROCEDURE — 25000242 PHARM REV CODE 250 ALT 637 W/ HCPCS: Performed by: HOSPITALIST

## 2025-06-23 PROCEDURE — 25000003 PHARM REV CODE 250: Performed by: NURSE PRACTITIONER

## 2025-06-23 PROCEDURE — 99900031 HC PATIENT EDUCATION (STAT)

## 2025-06-23 PROCEDURE — 80053 COMPREHEN METABOLIC PANEL: CPT

## 2025-06-23 PROCEDURE — 94640 AIRWAY INHALATION TREATMENT: CPT

## 2025-06-23 PROCEDURE — 94761 N-INVAS EAR/PLS OXIMETRY MLT: CPT

## 2025-06-23 PROCEDURE — 27000221 HC OXYGEN, UP TO 24 HOURS

## 2025-06-23 PROCEDURE — 85025 COMPLETE CBC W/AUTO DIFF WBC: CPT

## 2025-06-23 RX ORDER — FUROSEMIDE 10 MG/ML
40 INJECTION INTRAMUSCULAR; INTRAVENOUS DAILY
Status: DISCONTINUED | OUTPATIENT
Start: 2025-06-23 | End: 2025-06-24

## 2025-06-23 RX ADMIN — POLYETHYLENE GLYCOL 3350 17 G: 17 POWDER, FOR SOLUTION ORAL at 07:06

## 2025-06-23 RX ADMIN — ALBUTEROL SULFATE 2.5 MG: 2.5 SOLUTION RESPIRATORY (INHALATION) at 01:06

## 2025-06-23 RX ADMIN — BUPROPION HYDROCHLORIDE 100 MG: 100 TABLET, FILM COATED, EXTENDED RELEASE ORAL at 07:06

## 2025-06-23 RX ADMIN — BUPROPION HYDROCHLORIDE 100 MG: 100 TABLET, FILM COATED, EXTENDED RELEASE ORAL at 08:06

## 2025-06-23 RX ADMIN — METHOCARBAMOL 500 MG: 500 TABLET ORAL at 05:06

## 2025-06-23 RX ADMIN — KETOROLAC TROMETHAMINE 15 MG: 30 INJECTION, SOLUTION INTRAMUSCULAR; INTRAVENOUS at 10:06

## 2025-06-23 RX ADMIN — METOPROLOL TARTRATE 25 MG: 25 TABLET, FILM COATED ORAL at 07:06

## 2025-06-23 RX ADMIN — SODIUM SULFATE, POTASSIUM SULFATE, MAGNESIUM SULFATE 177 ML: 17.5; 3.13; 1.6 SOLUTION, CONCENTRATE ORAL at 06:06

## 2025-06-23 RX ADMIN — ALBUTEROL SULFATE 2.5 MG: 2.5 SOLUTION RESPIRATORY (INHALATION) at 11:06

## 2025-06-23 RX ADMIN — HYDROCODONE BITARTRATE AND ACETAMINOPHEN 1 TABLET: 5; 325 TABLET ORAL at 05:06

## 2025-06-23 RX ADMIN — HYDROCORTISONE ACETATE 25 MG: 25 SUPPOSITORY RECTAL at 08:06

## 2025-06-23 RX ADMIN — HYDROCODONE BITARTRATE AND ACETAMINOPHEN 1 TABLET: 5; 325 TABLET ORAL at 12:06

## 2025-06-23 RX ADMIN — METOPROLOL TARTRATE 25 MG: 25 TABLET, FILM COATED ORAL at 08:06

## 2025-06-23 RX ADMIN — LEVOTHYROXINE SODIUM 125 MCG: 0.12 TABLET ORAL at 08:06

## 2025-06-23 RX ADMIN — HYDROCODONE BITARTRATE AND ACETAMINOPHEN 1 TABLET: 5; 325 TABLET ORAL at 08:06

## 2025-06-23 RX ADMIN — KETOROLAC TROMETHAMINE 15 MG: 30 INJECTION, SOLUTION INTRAMUSCULAR; INTRAVENOUS at 12:06

## 2025-06-23 RX ADMIN — METHOCARBAMOL 500 MG: 500 TABLET ORAL at 08:06

## 2025-06-23 RX ADMIN — FUROSEMIDE 40 MG: 10 INJECTION, SOLUTION INTRAVENOUS at 08:06

## 2025-06-23 RX ADMIN — POLYETHYLENE GLYCOL 3350 17 G: 17 POWDER, FOR SOLUTION ORAL at 08:06

## 2025-06-23 RX ADMIN — ALBUTEROL SULFATE 2.5 MG: 2.5 SOLUTION RESPIRATORY (INHALATION) at 03:06

## 2025-06-23 RX ADMIN — ALBUTEROL SULFATE 2.5 MG: 2.5 SOLUTION RESPIRATORY (INHALATION) at 07:06

## 2025-06-23 RX ADMIN — HYDROCODONE BITARTRATE AND ACETAMINOPHEN 1 TABLET: 5; 325 TABLET ORAL at 03:06

## 2025-06-23 RX ADMIN — PANTOPRAZOLE SODIUM 40 MG: 40 INJECTION, POWDER, FOR SOLUTION INTRAVENOUS at 08:06

## 2025-06-23 NOTE — PLAN OF CARE
Problem: Adult Inpatient Plan of Care  Goal: Plan of Care Review  Outcome: Ongoing  Goal: Patient-Specific Goal (Individualized)  Outcome: Ongoing  Goal: Absence of Hospital-Acquired Illness or Injury  Outcome: Ongoing  Goal: Optimal Comfort and Wellbeing  Outcome: Ongoing  Goal: Readiness for Transition of Care  Outcome: Ongoing     Problem: Bariatric Environmental Safety  Goal: Safety Maintained with Care  Outcome: Ongoing

## 2025-06-23 NOTE — PROGRESS NOTES
Inpatient Nutrition Assessment    Admit Date: 6/20/2025   Total duration of encounter: 3 days   Patient Age: 65 y.o.    Nutrition Recommendation/Prescription     Diet Clear Liquid ordered, advance as medically feasible  Encouraged adequate PO intake  Monitor appetite/PO intake, weight, and labs    Communication of Recommendations: reviewed with patient    Nutrition Assessment     Malnutrition Assessment/Nutrition-Focused Physical Exam       Malnutrition Level: other (see comments) (Does not meet criteria) (06/23/25 1542)  Energy Intake (Malnutrition): other (see comments) (Does not meet criteria) (06/23/25 1542)  Weight Loss (Malnutrition): other (see comments) (Does not meet criteria) (06/23/25 1542)                                                  A minimum of two characteristics is recommended for diagnosis of either severe or non-severe malnutrition.    Chart Review    Reason Seen: continuous nutrition monitoring CHF    Malnutrition Screening Tool Results   Have you recently lost weight without trying?: No  Have you been eating poorly because of a decreased appetite?: No   MST Score: 0   Diagnosis:    BRBPR  - x 1 episode   Acute macrocytic anemia  - Hgb 10.2--11.5--9.5--8.8--10.3--8.9--9.7--9.6  3.   Rectal pain / spasms   - hx of frequent falls  4.   Hx of colon polyps  - last colonoscopy in 2018 / 2019 (Dr. Aviles) with removal of multiple polyps.  Recommend 5 year recall.  Patient is overdue.     Relevant Medical History:    Arthritis      Atrial fibrillation      Cellulitis and abscess of leg      CHF (congestive heart failure)      Claustrophobia      COPD (chronic obstructive pulmonary disease)      Depression      GERD (gastroesophageal reflux disease)      Hyperlipidemia      Hypertension      Obesity      Oxygen dependent       2.5L    Sleep apnea       does not use CPAP    Thyroid disease          Scheduled Medications:  albuterol, 2.5 mg, Q8H  buPROPion, 100 mg, BID  furosemide (LASIX) injection, 40  mg, Daily  hydrocortisone, 25 mg, BID  levothyroxine, 125 mcg, Daily  methocarbamoL, 500 mg, TID  metoprolol tartrate, 25 mg, BID  pantoprazole, 40 mg, Daily  polyethylene glycol, 17 g, BID  sodium,potassium,mag sulfates, 1 Bottle, BID    Continuous Infusions:   PRN Medications:  dextrose 50%, 12.5 g, PRN  dextrose 50%, 25 g, PRN  glucagon (human recombinant), 1 mg, PRN  glucose, 16 g, PRN  glucose, 24 g, PRN  HYDROcodone-acetaminophen, 1 tablet, Q4H PRN  ketorolac, 15 mg, Q6H PRN  melatonin, 6 mg, Nightly PRN  morphine, 4 mg, Q4H PRN  naloxone, 0.02 mg, PRN  sodium chloride 0.9%, 10 mL, Q12H PRN    Calorie Containing IV Medications: no significant kcals from medications at this time    Recent Labs   Lab 06/20/25  1014 06/20/25  1641 06/21/25  0023 06/21/25  0351 06/21/25  0819 06/21/25  1150 06/22/25  0442 06/23/25  0419   *  --   --  132*  --   --  132* 131*   K 4.7  --   --  3.8  --   --  4.2 3.6   CALCIUM 9.5  --   --  8.8  --   --  8.4 8.7   MG 1.90  --   --   --   --   --   --   --    CL 94*  --   --  96*  --   --  97* 95*   CO2 28  --   --  29  --   --  27 31   BUN 8.8*  --   --  7.5*  --   --  7.1* 8.8*   CREATININE 0.73  --   --  0.61  --   --  0.63 0.62   EGFRNORACEVR >60  --   --  >60  --   --  >60 >60   *  --   --  97  --   --  103 100   BILITOT 0.9  --   --  0.8  --   --  0.7 0.7   ALKPHOS 245*  --   --  187*  --   --  190* 191*   ALT 20  --   --  16  --   --  13 12   AST 23  --   --  15  --   --  16 13   ALBUMIN 3.5  --   --  2.9*  --   --  3.0* 2.8*   WBC 4.42*  --   --  3.76*  --   --  3.81* 3.91*   HGB 10.2* 11.5* 9.5* 8.8* 10.3* 8.9* 9.7* 9.6*   HCT 30.4* 34.9* 29.1* 27.3* 31.7* 27.8* 29.7* 29.4*     Nutrition Orders:  Diet Clear Liquid  Diet NPO Except for: Medication      Appetite/Oral Intake: fair/50-75% of meals  Factors Affecting Nutritional Intake: clear liquid diet  Social Needs Impacting Access to Food: none identified  Food/Pentecostal/Cultural Preferences: none reported  Food  "Allergies: none reported  Last Bowel Movement: 06/17/25  Wound(s):  none noted    Comments    6/23/2025: No significant fat/muscle wasting per NFPE. Pt reports a good appetite/PO intake prior to admit. Pt reports fair appetite/PO intake due to Clear Liquid diet. Pt denies N/V/D/C and chewing/swallowing difficulties. Pt denies unplanned wt loss. Encouraged adequate PO intake. Last BM noted. Pt reports being on Wegovy prior to admit. Will monitor.    Anthropometrics    Height: 5' 6" (167.6 cm), Height Method: Stated  Last Weight: 122.7 kg (270 lb 8.1 oz) (06/23/25 0500), Weight Method: Standard Scale  BMI (Calculated): 43.7  BMI Classification: obese grade III (BMI >/=40)     Ideal Body Weight (IBW), Female: 130 lb     % Ideal Body Weight, Female (lb): 207.69 %                             Usual Weight Provided By: patient denies unintentional weight loss    Wt Readings from Last 5 Encounters:   06/23/25 122.7 kg (270 lb 8.1 oz)   01/19/23 127.6 kg (281 lb 4.9 oz)   12/13/22 132.4 kg (291 lb 14.2 oz)   11/15/22 (!) 141.5 kg (311 lb 15.2 oz)     Weight Change(s) Since Admission:   6/23/2025: 122.7 kg  Wt Readings from Last 1 Encounters:   06/23/25 0500 122.7 kg (270 lb 8.1 oz)   06/20/25 0935 122.5 kg (270 lb)   Admit Weight: 122.5 kg (270 lb) (06/20/25 0935), Weight Method: Stated    Estimated Needs    Weight Used For Calorie Calculations: 122.7 kg (270 lb 8.1 oz)  Energy Calorie Requirements (kcal): 3789-9081 (MSJ x 1.0-1.2)  Energy Need Method: [facility]  Weight Used For Protein Calculations: 122.7 kg (270 lb 8.1 oz)  Protein Requirements: 98 (0.8 g/kg)  Fluid Requirements (mL): 2000 (CHF)  CHO Requirement: 202-247 g/day (~45-55% est min kcal needs)     Enteral Nutrition     Patient not receiving enteral nutrition at this time.    Parenteral Nutrition     Patient not receiving parenteral nutrition support at this time.    Evaluation of Received Nutrient Intake    Calories: not meeting estimated needs  Protein: " not meeting estimated needs    Patient Education     Not applicable.    Nutrition Diagnosis     PES: Inadequate oral intake related to acute illness as evidenced by decreased appetite/PO intake for 3 days. (new)     PES:            Nutrition Interventions     Intervention(s): general/healthful diet  Intervention(s):      Goal: Meet greater than 80% of nutritional needs by follow-up. (new)    Nutrition Goals & Monitoring     Dietitian will monitor: food and beverage intake, weight, electrolyte/renal panel, glucose/endocrine profile, and gastrointestinal profile  Discharge planning: resume home regimen  Nutrition Risk/Follow-Up: dietitian will follow-up one time per week   Please consult if re-assessment needed sooner.

## 2025-06-23 NOTE — PROGRESS NOTES
"Gastroenterology Progress Note    Subjective/Interval History:    No acute events overnight.  Remains afebrile and hemodynamically stable.   Hgb stable at 9.6.  Had a bowel movement this morning with no evidence of overt bleeding.  She continues to report improvement of spasms and pain with Anusol suppository and Robaxin.    Evaluated by Neurosurgery yesterday for mild to moderate stenosis at L3-L4 and to a lesser degree at L4-L5 with significant atrophy of both levels.  No plans for surgical intervention at this time.    ROS:  Constitutional:  Positive for activity change, appetite change and fatigue. Negative for unexpected weight change.   HENT:  Negative for trouble swallowing.    Respiratory:  Positive for shortness of breath (chronic).    Cardiovascular:  Positive for leg swelling (chronic). Negative for chest pain.   Gastrointestinal:  Positive for anal bleeding (1 episode) and rectal pain / spasms. Negative for abdominal distention, abdominal pain, constipation, diarrhea, nausea and vomiting.   Musculoskeletal:  Positive for back pain.   Neurological:  Negative for dizziness, weakness and light-headedness.    Vital Signs:  /68 (Patient Position: Lying)   Pulse 81   Temp 97.7 °F (36.5 °C) (Axillary)   Resp 16   Ht 5' 6" (1.676 m)   Wt 122.7 kg (270 lb 8.1 oz)   SpO2 (!) 92%   BMI 43.66 kg/m²   Body mass index is 43.66 kg/m².    Physical Exam  Constitutional:       General: She is not in acute distress.     Appearance: She is obese. She is ill-appearing (chronically).   HENT:      Head: Normocephalic and atraumatic.      Mouth/Throat:      Mouth: Mucous membranes are dry.   Eyes:      Extraocular Movements: Extraocular movements intact.      Pupils: Pupils are equal, round, and reactive to light.   Cardiovascular:      Rate and Rhythm: Normal rate and regular rhythm.      Pulses: Normal pulses.      Heart sounds: Normal heart sounds.   Pulmonary:      Effort: Pulmonary effort is normal.      " Breath sounds: Normal breath sounds.      Comments: 2 L NC (baseline)  Abdominal:      General: Bowel sounds are normal. There is no distension.      Palpations: Abdomen is soft.      Tenderness: There is no abdominal tenderness. There is no guarding.   Musculoskeletal:      Right lower leg: Edema present.      Left lower leg: Edema present.   Skin:     General: Skin is warm and dry.      Findings: Erythema (bilateral LE) present.   Neurological:      Mental Status: She is alert and oriented to person, place, and time.   Psychiatric:         Mood and Affect: Mood normal.         Behavior: Behavior normal.     Labs:  Recent Results (from the past 48 hours)   Urinalysis, Reflex to Urine Culture Urine, Clean Catch    Collection Time: 06/21/25 11:03 AM    Specimen: Urine   Result Value Ref Range    Color, UA Yellow Yellow, Light-Yellow, Colorless, Straw, Dark-Yellow    Appearance, UA Clear Clear    Specific Gravity, UA 1.013 1.005 - 1.030    pH, UA 6.5 5.0 - 8.5    Protein, UA Negative Negative    Glucose, UA Normal Negative, Normal    Ketones, UA Negative Negative    Blood, UA Negative Negative    Bilirubin, UA Negative Negative    Urobilinogen, UA Normal 0.2, 1.0, Normal    Nitrites, UA Negative Negative    Leukocyte Esterase, UA Negative Negative    RBC, UA 0-5 None Seen, 0-2, 3-5, 0-5 /HPF    WBC, UA 0-5 None Seen, 0-2, 3-5, 0-5 /HPF    Bacteria, UA None Seen None Seen, Trace /HPF    Squamous Epithelial Cells, UA Trace None Seen, Trace /HPF   Hemoglobin and Hematocrit    Collection Time: 06/21/25 11:50 AM   Result Value Ref Range    Hgb 8.9 (L) 12.0 - 16.0 g/dL    Hct 27.8 (L) 37.0 - 47.0 %   Comprehensive Metabolic Panel (CMP)    Collection Time: 06/22/25  4:42 AM   Result Value Ref Range    Sodium 132 (L) 136 - 145 mmol/L    Potassium 4.2 3.5 - 5.1 mmol/L    Chloride 97 (L) 98 - 107 mmol/L    CO2 27 23 - 31 mmol/L    Glucose 103 82 - 115 mg/dL    Blood Urea Nitrogen 7.1 (L) 9.8 - 20.1 mg/dL    Creatinine 0.63 0.55  - 1.02 mg/dL    Calcium 8.4 8.4 - 10.2 mg/dL    Protein Total 6.1 5.8 - 7.6 gm/dL    Albumin 3.0 (L) 3.4 - 4.8 g/dL    Globulin 3.1 2.4 - 3.5 gm/dL    Albumin/Globulin Ratio 1.0 (L) 1.1 - 2.0 ratio    Bilirubin Total 0.7 <=1.5 mg/dL     (H) 40 - 150 unit/L    ALT 13 0 - 55 unit/L    AST 16 11 - 45 unit/L    eGFR >60 mL/min/1.73/m2    Anion Gap 8.0 mEq/L    BUN/Creatinine Ratio 11    APTT    Collection Time: 06/22/25  4:42 AM   Result Value Ref Range    PTT 27.9 23.2 - 33.7 seconds   CBC with Differential    Collection Time: 06/22/25  4:42 AM   Result Value Ref Range    WBC 3.81 (L) 4.50 - 11.50 x10(3)/mcL    RBC 3.12 (L) 4.20 - 5.40 x10(6)/mcL    Hgb 9.7 (L) 12.0 - 16.0 g/dL    Hct 29.7 (L) 37.0 - 47.0 %    MCV 95.2 (H) 80.0 - 94.0 fL    MCH 31.1 (H) 27.0 - 31.0 pg    MCHC 32.7 (L) 33.0 - 36.0 g/dL    RDW 18.8 (H) 11.5 - 17.0 %    Platelet 132 130 - 400 x10(3)/mcL    MPV 9.0 7.4 - 10.4 fL    Neut % 69.1 %    Lymph % 17.3 %    Mono % 10.5 %    Eos % 1.8 %    Basophil % 1.0 %    Imm Grans % 0.3 %    Neut # 2.63 2.1 - 9.2 x10(3)/mcL    Lymph # 0.66 0.6 - 4.6 x10(3)/mcL    Mono # 0.40 0.1 - 1.3 x10(3)/mcL    Eos # 0.07 0 - 0.9 x10(3)/mcL    Baso # 0.04 <=0.2 x10(3)/mcL    Imm Gran # 0.01 0.00 - 0.04 x10(3)/mcL    NRBC% 0.0 %   Comprehensive Metabolic Panel (CMP)    Collection Time: 06/23/25  4:19 AM   Result Value Ref Range    Sodium 131 (L) 136 - 145 mmol/L    Potassium 3.6 3.5 - 5.1 mmol/L    Chloride 95 (L) 98 - 107 mmol/L    CO2 31 23 - 31 mmol/L    Glucose 100 82 - 115 mg/dL    Blood Urea Nitrogen 8.8 (L) 9.8 - 20.1 mg/dL    Creatinine 0.62 0.55 - 1.02 mg/dL    Calcium 8.7 8.4 - 10.2 mg/dL    Protein Total 6.4 5.8 - 7.6 gm/dL    Albumin 2.8 (L) 3.4 - 4.8 g/dL    Globulin 3.6 (H) 2.4 - 3.5 gm/dL    Albumin/Globulin Ratio 0.8 (L) 1.1 - 2.0 ratio    Bilirubin Total 0.7 <=1.5 mg/dL     (H) 40 - 150 unit/L    ALT 12 0 - 55 unit/L    AST 13 11 - 45 unit/L    eGFR >60 mL/min/1.73/m2    Anion Gap 5.0 mEq/L     BUN/Creatinine Ratio 14    CBC with Differential    Collection Time: 06/23/25  4:19 AM   Result Value Ref Range    WBC 3.91 (L) 4.50 - 11.50 x10(3)/mcL    RBC 3.08 (L) 4.20 - 5.40 x10(6)/mcL    Hgb 9.6 (L) 12.0 - 16.0 g/dL    Hct 29.4 (L) 37.0 - 47.0 %    MCV 95.5 (H) 80.0 - 94.0 fL    MCH 31.2 (H) 27.0 - 31.0 pg    MCHC 32.7 (L) 33.0 - 36.0 g/dL    RDW 18.6 (H) 11.5 - 17.0 %    Platelet 136 130 - 400 x10(3)/mcL    MPV 8.9 7.4 - 10.4 fL    Neut % 68.5 %    Lymph % 17.6 %    Mono % 10.0 %    Eos % 2.8 %    Basophil % 0.8 %    Imm Grans % 0.3 %    Neut # 2.68 2.1 - 9.2 x10(3)/mcL    Lymph # 0.69 0.6 - 4.6 x10(3)/mcL    Mono # 0.39 0.1 - 1.3 x10(3)/mcL    Eos # 0.11 0 - 0.9 x10(3)/mcL    Baso # 0.03 <=0.2 x10(3)/mcL    Imm Gran # 0.01 0.00 - 0.04 x10(3)/mcL    NRBC% 0.0 %       Imaging:  Echo  Result Date: 6/21/2025    Left Ventricle: The left ventricle is normal in size. Normal wall thickness. There is normal systolic function with a visually estimated ejection fraction of 55 - 60%. Unable to assess diastolic function due to atrial fibrillation.   Right Ventricle: Right ventricle was not well visualized due to poor acoustic window.   Left Atrium: The left atrium is severely dilated   Right Atrium: The right atrium is dilated.   Aortic Valve: There is aortic valve sclerosis.   Mitral Valve: There is mitral annular calcification. There is moderate regurgitation.   Tricuspid Valve: There is mild to moderate regurgitation.   Pulmonary Artery: There is mild pulmonary hypertension. The estimated pulmonary artery systolic pressure is 44 mmHg.   IVC/SVC: Intermediate venous pressure at 8 mmHg.   Pericardium: There is no pericardial effusion.     MRI Lumbar Spine Without Contrast  Result Date: 6/20/2025  EXAMINATION: MRI LUMBAR SPINE WITHOUT CONTRAST TECHNIQUE: Low back pain, cauda equina syndrome suspected; COMPARISON: None available FINDINGS: For the purpose of this report, the most inferior well developed intervertebral  disc space is presumed to represent L5-S1.  There are Schmorl node defects along the superior endplates of T12 and L1-1. Otherwise, lumbar vertebrae stature is preserved and there is no acute marrow edematous signal.  Hemangioma involves L1 vertebral body.  There is no significant listhesis.  Lumbar discs are desiccated throughout.  Visualized thoracic cord is assessed with limitations due to artifacts the conus medullaris terminates at L1.  Disc segmental analysis is given below: At L1-L2, disc is unremarkable.  Minimal facet arthropathy.  Central canal is not stenosed.  There are no narrowings of the neural foramen. At L2-L3, disc is unremarkable.  Minimal facet arthropathy.  Central canal is not stenosed and there are no narrowings of the neural foramen. At L3-L4, there is disc bulge which indents and flattens the ventral thecal sac.  Bilateral facet arthropathy and ligamentum flavum thickening.  These findings combine to cause mild to moderate central canal stenosis.  Right neural foramen is patent.  There is mild spondylotic narrowing of the left neural foramen At L4-L5, there is generalized disc bulge, ligamentum flavum thickening and facet arthropathy causing moderate to marked central canal stenosis.  There are bilateral mild spondylotic narrowings of the neural foramen. At L5-S1, disc is unremarkable.  Bilateral facet arthropathy.  No significant central canal stenosis.  Bilateral neural foramen are patent.     Lumbar degenerative disc disease and spondylosis level by level discussed above. Electronically signed by: Wliver Lino Date:    06/20/2025 Time:    22:23    X-Ray Abdomen AP 1 View  Result Date: 6/20/2025  EXAMINATION: XR ABDOMEN AP 1 VIEW CLINICAL HISTORY: suspected obstruction; TECHNIQUE: AP View(s) of the abdomen was performed. COMPARISON: None FINDINGS: The examination is very limited due to the patient's body habitus.  No determination can be made about the intra-abdominal contents.  Repeat  examination with increased penetration or CT scan correlation is recommended.     Nondiagnostic examination due to patient's body habitus Electronically signed by: Cody Hoffmann Date:    06/20/2025 Time:    19:02    X-Ray Sacrum And Coccyx  Result Date: 6/20/2025  EXAMINATION: XR SACRUM AND COCCYX CLINICAL HISTORY: Unspecified injury of lower back, initial encounter COMPARISON: None. FINDINGS: There is no definite displaced sacrococcygeal fracture identified.  The soft tissues are unremarkable.     No definite displaced fracture identified. Electronically signed by: Edel Waggoner Date:    06/20/2025 Time:    11:21    X-Ray Chest AP Portable  Result Date: 6/20/2025  EXAMINATION: XR CHEST AP PORTABLE CLINICAL HISTORY: shortness of breath; COMPARISON: 20 February 2020 FINDINGS: Frontal view of the chest was obtained. Stable cardiomegaly.  Mild interstitial prominence.  No dense consolidation or pneumothorax.     Question some pulmonary vascular congestion. Electronically signed by: Bert Mar Date:    06/20/2025 Time:    11:12         Assessment/Plan:    65 year old female unknown to our group with a PMHx of arthritis, afib, CHF, COPD on 2 L home O2, depression, GERD, HLD, HTN, morbid obesity (BMI 43), Sleep apnea, and thyroid disease who presented to the ER 6/20/25 complaining of shortness of breath and worsening bilateral LE edema with muscle spasms. Admitted with acute on chronic diastolic heart failure with hypoxia and hyponatremia.  GI consulted for BRBPR and anemia.     BRBPR  - x 1 episode   Acute macrocytic anemia  - Hgb 10.2--11.5--9.5--8.8--10.3--8.9--9.7--9.6  3.   Rectal pain / spasms   - hx of frequent falls  4.   Hx of colon polyps  - last colonoscopy in 2018 / 2019 (Dr. Aviles) with removal of multiple polyps.  Recommend 5 year recall.  Patient is overdue.      - Continue Anusol suppositories and robaxin   - Suspect her rectal pain / spasms is originating in her lumbar spine and related to her  frequent falls.    - Daily PPI for GI prophylaxis   - Monitor H/H and transfuse as needed to Hgb 7  - Monitor stools for bleeding  - Miralax BID for constipation   - Clear liquid diet today, NPO at midnight for colonoscopy tomorrow, 6/24    Case and plan discussed with Dr. Susan Brandt, FNP-C

## 2025-06-23 NOTE — PLAN OF CARE
"Pharmacy: [facility] [place]  Current with [facility]. Clinical  updates sent to [facility]  Home O2 with Angela (home concen and portable)  Pt states she is indep in adl's at home.   States she had some falls prior to admit "d/t K+ not right"  Food resource and info on [employer] [place] [employer] for Meals on Wheels provided to pt.   06/23/25 1334   Discharge Assessment   Assessment Type Discharge Planning Assessment   Confirmed/corrected address, phone number and insurance Yes   Confirmed Demographics Correct on Facesheet   Source of Information patient   Communicated NORA with patient/caregiver Date not available/Unable to determine   People in Home significant other   Name(s) of People in Home Hector (SO) [phone]   Do you expect to return to your current living situation? Yes   Do you have help at home or someone to help you manage your care at home? Yes   Who are your caregiver(s) and their phone number(s)? Hector (SO); Nicky (dgt)   Current cognitive status: Alert/Oriented   Walking or Climbing Stairs Difficulty yes   Walking or Climbing Stairs ambulation difficulty, requires equipment   Mobility Management RW, rollator, transport w/c   Dressing/Bathing Difficulty yes   Dressing/Bathing bathing difficulty, requires equipment   Dressing/Bathing Management shower chair, grab bar   Home Accessibility stairs to enter home   Number of Stairs, Main Entrance five   Stair Railings, Main Entrance railing on right side (ascending)   Home Layout Able to live on 1st floor   Equipment Currently Used at Home CPAP;rollator;shower chair;grab bar;wheelchair;walker, rolling;oxygen   Readmission within 30 days? Yes   Do you currently have service(s) that help you manage your care at home? Yes   Name and Contact number of agency [facility]   Do you take prescription medications? Yes   Do you have any problems affording any of your prescribed medications? No   Who is going to help you get home at discharge? family "   How do you get to doctors appointments? car, drives self   Are you on dialysis? No   Discharge Plan A Home Health   Discharge Plan B Home Health   DME Needed Upon Discharge  none   Discharge Plan discussed with: Patient   Transition of Care Barriers None   Financial Resource Strain   How hard is it for you to pay for the very basics like food, housing, medical care, and heating? Hard   Housing Stability   In the last 12 months, was there a time when you were not able to pay the mortgage or rent on time? N   At any time in the past 12 months, were you homeless or living in a shelter (including now)? N   Transportation Needs   In the past 12 months, has lack of transportation kept you from medical appointments or from getting medications? no   In the past 12 months, has lack of transportation kept you from meetings, work, or from getting things needed for daily living? No   Food Insecurity   Within the past 12 months, you worried that your food would run out before you got the money to buy more. Sometimes   Within the past 12 months, the food you bought just didn't last and you didn't have money to get more. Sometimes   Stress   Do you feel stress - tense, restless, nervous, or anxious, or unable to sleep at night because your mind is troubled all the time - these days? Rather much   Social Isolation   How often do you feel lonely or isolated from those around you?  Never   Alcohol Use   Q1: How often do you have a drink containing alcohol? 4 or more ti   Q2: How many drinks containing alcohol do you have on a typical day when you are drinking? 1 or 2   Q3: How often do you have six or more drinks on one occasion? Never   [employer]   In the past 12 months has the electric, gas, oil, or water company threatened to shut off services in your home? No   Health Literacy   How often do you need to have someone help you when you read instructions, pamphlets, or other written material from your doctor or pharmacy? Never

## 2025-06-23 NOTE — PROGRESS NOTES
[facility] Hospital Medicine Progress Note        Chief Complaint: Inpatient Follow-up for     HPI:   65-year-old female with a past medical history of congestive heart failure, atrial fibrillation, COPD, GERD, HLD, HTN presented to the ED with shortness of breath and worsening lower extremity swelling bilaterally as well as muscle spasms. Patient reports associated frequent falling but has not fallen since being discharged from [facility] after being seen on 06/10/2025. Patient was seen at [facility] for frequent falling, lightheadedness, problems with balance and was treated for hypokalemia and hyponatremia. She was then discharged and was taken off of her Lasix by the Nephrology team there due to concerns with her kidneys. She then developed worsening shortness of breath and lower extremity swelling over the past week and although she restarted her Lasix on her own her symptoms did not improve. She also reports that the associated muscle spasms are primarily around her rectum and that she has never had these before. She also reports some associated BRBPR and difficulty having bowel movements.   Interval Hx:   6/21:  started her on robaxin and anusol suppositories.     6/22:  clear diet.  GI  started her on clear diet with hopeful plan for colonscopy tomor.  Spasm much better..  Sister and daughter at the bedside reports that she has been having increasing falls in which her legs just gives out.Her pain has worsened since her fall 1 week ago.   Pain better today but still too painful for walking   6/23:  clear diet.  Spasms much better.  Plan for colonoscopy tomor.      Case was discussed with patient's nurse and  on the floor.    Objective/physical exam:  General: In no acute distress, afebrile  Chest: Clear to auscultation bilaterally,  intermittent wheeze anteriorly, 2 L NC   Heart  RRR  Abdomen: Soft, nontender, BS +  MSK: Warm, + bilateral edema. With brawny  induration. Decreased swelling from yesterday   Neurologic: Alert and oriented x4    VITAL SIGNS: 24 HRS MIN & MAX LAST   Temp  Min: 97.9 °F (36.6 °C)  Max: 98.9 °F (37.2 °C) 97.9 °F (36.6 °C)   BP  Min: 117/62  Max: 151/92 (!) 151/92   Pulse  Min: 70  Max: 84  74   Resp  Min: 16  Max: 22 19   SpO2  Min: 92 %  Max: 100 % 95 %     I have reviewed the following labs:  Recent Labs   Lab 06/21/25  0351 06/21/25  0819 06/21/25  1150 06/22/25  0442 06/23/25  0419   WBC 3.76*  --   --  3.81* 3.91*   RBC 2.89*  --   --  3.12* 3.08*   HGB 8.8*   < > 8.9* 9.7* 9.6*   HCT 27.3*   < > 27.8* 29.7* 29.4*   MCV 94.5*  --   --  95.2* 95.5*   MCH 30.4  --   --  31.1* 31.2*   MCHC 32.2*  --   --  32.7* 32.7*   RDW 18.5*  --   --  18.8* 18.6*     --   --  132 136   MPV 9.4  --   --  9.0 8.9    < > = values in this interval not displayed.     Recent Labs   Lab 06/20/25  1014 06/21/25  0351 06/22/25  0442 06/23/25  0419   * 132* 132* 131*   K 4.7 3.8 4.2 3.6   CL 94* 96* 97* 95*   CO2 28 29 27 31   BUN 8.8* 7.5* 7.1* 8.8*   CREATININE 0.73 0.61 0.63 0.62   * 97 103 100   CALCIUM 9.5 8.8 8.4 8.7   MG 1.90  --   --   --    ALBUMIN 3.5 2.9* 3.0* 2.8*   PROT 7.6 6.3 6.1 6.4   ALKPHOS 245* 187* 190* 191*   ALT 20 16 13 12   AST 23 15 16 13   BILITOT 0.9 0.8 0.7 0.7     Microbiology Results (last 7 days)       ** No results found for the last 168 hours. **             See below for Radiology    Assessment/Plan:  HF pEF with LE edema   Soft BP   Hyponatremia, chronic   Leg discomfort due to swelling   Rectum spasm and low back pain   COPD   A fib   BRB suspected from rectum    - Lasix iv 40q d as tolerated.   Continue low dose metoprolol  for HR control , tele .   I/O.  Legs appears slight ly improved, however it really has appearance of chronic disease  - GI consulted, PPI qd.  Anusol suppositories 6/21. Clear diet and colonoscopy tomor.    - robaxin started 6/21.  Norco PRN   - MRI reviewed and luumbar degenerative disc  and spondylosis noted.  Neuro surgery indicates no surgical intervention.     - PT to evaluate    VTE prophylaxis:  Scd     Patient condition:  Stable    Anticipated discharge and Disposition:         All diagnosis and differential diagnosis have been reviewed; assessment and plan has been documented; I have personally reviewed the labs and test results that are presently available; I have reviewed the patients medication list; I have reviewed the consulting providers response and recommendations. I have reviewed or attempted to review medical records based upon their availability    All of the patient's questions have been  addressed and answered. Patient's is agreeable to the above stated plan. I will continue to monitor closely and make adjustments to medical management as needed.    Portions of this note dictated using EMR integrated voice recognition software, and may be subject to voice recognition errors not corrected at proofreading. Please contact writer for clarification if needed.   _____________________________________________________________________    Malnutrition Status:  Nutrition consulted. Most recent weight and BMI monitored-     Measurements:  Wt Readings from Last 1 Encounters:   06/23/25 122.7 kg (270 lb 8.1 oz)   Body mass index is 43.66 kg/m².    Patient has been screened and assessed by RD.    Malnutrition Type:  Context:    Level:      Malnutrition Characteristic Summary:       Interventions/Recommendations (treatment strategy):        Scheduled Med:   albuterol  2.5 mg Nebulization Q8H    buPROPion  100 mg Oral BID    furosemide (LASIX) injection  40 mg Intravenous Daily    hydrocortisone  25 mg Rectal BID    levothyroxine  125 mcg Oral Daily    methocarbamoL  500 mg Oral TID    metoprolol tartrate  25 mg Oral BID    pantoprazole  40 mg Intravenous Daily    polyethylene glycol  17 g Oral BID    sodium,potassium,mag sulfates  1 Bottle Oral BID      Continuous Infusions:     PRN  Meds:    Current Facility-Administered Medications:     dextrose 50%, 12.5 g, Intravenous, PRN    dextrose 50%, 25 g, Intravenous, PRN    glucagon (human recombinant), 1 mg, Intramuscular, PRN    glucose, 16 g, Oral, PRN    glucose, 24 g, Oral, PRN    HYDROcodone-acetaminophen, 1 tablet, Oral, Q4H PRN    ketorolac, 15 mg, Intravenous, Q6H PRN    melatonin, 6 mg, Oral, Nightly PRN    morphine, 4 mg, Intravenous, Q4H PRN    naloxone, 0.02 mg, Intravenous, PRN    sodium chloride 0.9%, 10 mL, Intravenous, Q12H PRN     Radiology:  I have personally reviewed the following imaging and agree with the radiologist.     Echo    Left Ventricle: The left ventricle is normal in size. Normal wall   thickness. There is normal systolic function with a visually estimated   ejection fraction of 55 - 60%. Unable to assess diastolic function due to   atrial fibrillation.    Right Ventricle: Right ventricle was not well visualized due to poor   acoustic window.    Left Atrium: The left atrium is severely dilated    Right Atrium: The right atrium is dilated.    Aortic Valve: There is aortic valve sclerosis.    Mitral Valve: There is mitral annular calcification. There is moderate   regurgitation.    Tricuspid Valve: There is mild to moderate regurgitation.    Pulmonary Artery: There is mild pulmonary hypertension. The estimated   pulmonary artery systolic pressure is 44 mmHg.    IVC/SVC: Intermediate venous pressure at 8 mmHg.    Pericardium: There is no pericardial effusion.      Ashlee Stiles MD  Department of Hospital Medicine   [facility]   06/23/2025

## 2025-06-24 ENCOUNTER — ANESTHESIA (OUTPATIENT)
Dept: ENDOSCOPY | Facility: HOSPITAL | Age: 65
End: 2025-06-24
Payer: MEDICARE

## 2025-06-24 ENCOUNTER — ANESTHESIA EVENT (OUTPATIENT)
Dept: ENDOSCOPY | Facility: HOSPITAL | Age: 65
End: 2025-06-24
Payer: MEDICARE

## 2025-06-24 PROBLEM — K59.4 RECTAL SPASM: Status: ACTIVE | Noted: 2025-06-24

## 2025-06-24 LAB
ANION GAP SERPL CALC-SCNC: 11 MEQ/L
BASOPHILS # BLD AUTO: 0.03 X10(3)/MCL
BASOPHILS NFR BLD AUTO: 0.8 %
BUN SERPL-MCNC: 9.4 MG/DL (ref 9.8–20.1)
CALCIUM SERPL-MCNC: 9 MG/DL (ref 8.4–10.2)
CHLORIDE SERPL-SCNC: 96 MMOL/L (ref 98–107)
CO2 SERPL-SCNC: 31 MMOL/L (ref 23–31)
CREAT SERPL-MCNC: 0.58 MG/DL (ref 0.55–1.02)
CREAT/UREA NIT SERPL: 16
EOSINOPHIL # BLD AUTO: 0.15 X10(3)/MCL (ref 0–0.9)
EOSINOPHIL NFR BLD AUTO: 4 %
ERYTHROCYTE [DISTWIDTH] IN BLOOD BY AUTOMATED COUNT: 18.4 % (ref 11.5–17)
GFR SERPLBLD CREATININE-BSD FMLA CKD-EPI: >60 ML/MIN/1.73/M2
GLUCOSE SERPL-MCNC: 94 MG/DL (ref 82–115)
HCT VFR BLD AUTO: 32.4 % (ref 37–47)
HGB BLD-MCNC: 10.5 G/DL (ref 12–16)
IMM GRANULOCYTES # BLD AUTO: 0.02 X10(3)/MCL (ref 0–0.04)
IMM GRANULOCYTES NFR BLD AUTO: 0.5 %
LYMPHOCYTES # BLD AUTO: 0.59 X10(3)/MCL (ref 0.6–4.6)
LYMPHOCYTES NFR BLD AUTO: 15.6 %
MCH RBC QN AUTO: 31.1 PG (ref 27–31)
MCHC RBC AUTO-ENTMCNC: 32.4 G/DL (ref 33–36)
MCV RBC AUTO: 95.9 FL (ref 80–94)
MONOCYTES # BLD AUTO: 0.31 X10(3)/MCL (ref 0.1–1.3)
MONOCYTES NFR BLD AUTO: 8.2 %
NEUTROPHILS # BLD AUTO: 2.69 X10(3)/MCL (ref 2.1–9.2)
NEUTROPHILS NFR BLD AUTO: 70.9 %
NRBC BLD AUTO-RTO: 0 %
PLATELET # BLD AUTO: 169 X10(3)/MCL (ref 130–400)
PMV BLD AUTO: 9.2 FL (ref 7.4–10.4)
POTASSIUM SERPL-SCNC: 3.6 MMOL/L (ref 3.5–5.1)
RBC # BLD AUTO: 3.38 X10(6)/MCL (ref 4.2–5.4)
SODIUM SERPL-SCNC: 138 MMOL/L (ref 136–145)
WBC # BLD AUTO: 3.79 X10(3)/MCL (ref 4.5–11.5)

## 2025-06-24 PROCEDURE — 80048 BASIC METABOLIC PNL TOTAL CA: CPT | Performed by: HOSPITALIST

## 2025-06-24 PROCEDURE — 88305 TISSUE EXAM BY PATHOLOGIST: CPT | Performed by: INTERNAL MEDICINE

## 2025-06-24 PROCEDURE — 0DBP8ZX EXCISION OF RECTUM, VIA NATURAL OR ARTIFICIAL OPENING ENDOSCOPIC, DIAGNOSTIC: ICD-10-PCS | Performed by: INTERNAL MEDICINE

## 2025-06-24 PROCEDURE — 94760 N-INVAS EAR/PLS OXIMETRY 1: CPT

## 2025-06-24 PROCEDURE — 37000009 HC ANESTHESIA EA ADD 15 MINS: Performed by: INTERNAL MEDICINE

## 2025-06-24 PROCEDURE — 11000001 HC ACUTE MED/SURG PRIVATE ROOM

## 2025-06-24 PROCEDURE — 45380 COLONOSCOPY AND BIOPSY: CPT | Mod: XS | Performed by: INTERNAL MEDICINE

## 2025-06-24 PROCEDURE — 99900031 HC PATIENT EDUCATION (STAT)

## 2025-06-24 PROCEDURE — 27201423 OPTIME MED/SURG SUP & DEVICES STERILE SUPPLY: Performed by: INTERNAL MEDICINE

## 2025-06-24 PROCEDURE — 45385 COLONOSCOPY W/LESION REMOVAL: CPT | Performed by: INTERNAL MEDICINE

## 2025-06-24 PROCEDURE — 25000003 PHARM REV CODE 250

## 2025-06-24 PROCEDURE — 63600175 PHARM REV CODE 636 W HCPCS: Performed by: HOSPITALIST

## 2025-06-24 PROCEDURE — 85025 COMPLETE CBC W/AUTO DIFF WBC: CPT | Performed by: HOSPITALIST

## 2025-06-24 PROCEDURE — 63600175 PHARM REV CODE 636 W HCPCS: Performed by: NURSE ANESTHETIST, CERTIFIED REGISTERED

## 2025-06-24 PROCEDURE — 37000008 HC ANESTHESIA 1ST 15 MINUTES: Performed by: INTERNAL MEDICINE

## 2025-06-24 PROCEDURE — 94640 AIRWAY INHALATION TREATMENT: CPT

## 2025-06-24 PROCEDURE — 27000221 HC OXYGEN, UP TO 24 HOURS

## 2025-06-24 PROCEDURE — 99900035 HC TECH TIME PER 15 MIN (STAT)

## 2025-06-24 PROCEDURE — 25000003 PHARM REV CODE 250: Performed by: NURSE ANESTHETIST, CERTIFIED REGISTERED

## 2025-06-24 PROCEDURE — 25000242 PHARM REV CODE 250 ALT 637 W/ HCPCS: Performed by: HOSPITALIST

## 2025-06-24 PROCEDURE — C1889 IMPLANT/INSERT DEVICE, NOC: HCPCS | Performed by: INTERNAL MEDICINE

## 2025-06-24 PROCEDURE — 36415 COLL VENOUS BLD VENIPUNCTURE: CPT | Performed by: HOSPITALIST

## 2025-06-24 PROCEDURE — 25000003 PHARM REV CODE 250: Performed by: HOSPITALIST

## 2025-06-24 PROCEDURE — 63600175 PHARM REV CODE 636 W HCPCS: Performed by: NURSE PRACTITIONER

## 2025-06-24 RX ORDER — GABAPENTIN 300 MG/1
600 CAPSULE ORAL 2 TIMES DAILY
Status: DISCONTINUED | OUTPATIENT
Start: 2025-06-24 | End: 2025-06-25 | Stop reason: HOSPADM

## 2025-06-24 RX ORDER — PROPOFOL 10 MG/ML
VIAL (ML) INTRAVENOUS
Status: COMPLETED
Start: 2025-06-24 | End: 2025-06-24

## 2025-06-24 RX ORDER — PROPOFOL 10 MG/ML
VIAL (ML) INTRAVENOUS
Status: DISCONTINUED | OUTPATIENT
Start: 2025-06-24 | End: 2025-06-24

## 2025-06-24 RX ORDER — LIDOCAINE HYDROCHLORIDE 10 MG/ML
INJECTION, SOLUTION EPIDURAL; INFILTRATION; INTRACAUDAL; PERINEURAL
Status: DISCONTINUED | OUTPATIENT
Start: 2025-06-24 | End: 2025-06-24

## 2025-06-24 RX ORDER — KETAMINE HYDROCHLORIDE 100 MG/ML
INJECTION, SOLUTION INTRAMUSCULAR; INTRAVENOUS
Status: DISCONTINUED | OUTPATIENT
Start: 2025-06-24 | End: 2025-06-24

## 2025-06-24 RX ORDER — HYDROCODONE BITARTRATE AND ACETAMINOPHEN 5; 325 MG/1; MG/1
1 TABLET ORAL EVERY 4 HOURS PRN
Refills: 0 | Status: DISCONTINUED | OUTPATIENT
Start: 2025-06-24 | End: 2025-06-25

## 2025-06-24 RX ORDER — LIDOCAINE HYDROCHLORIDE 10 MG/ML
INJECTION, SOLUTION EPIDURAL; INFILTRATION; INTRACAUDAL; PERINEURAL
Status: COMPLETED
Start: 2025-06-24 | End: 2025-06-24

## 2025-06-24 RX ORDER — KETAMINE HCL IN 0.9 % NACL 50 MG/5 ML
SYRINGE (ML) INTRAVENOUS
Status: DISPENSED
Start: 2025-06-24 | End: 2025-06-25

## 2025-06-24 RX ORDER — FUROSEMIDE 40 MG/1
80 TABLET ORAL DAILY
Status: DISCONTINUED | OUTPATIENT
Start: 2025-06-25 | End: 2025-06-25 | Stop reason: HOSPADM

## 2025-06-24 RX ORDER — PROPOFOL 10 MG/ML
VIAL (ML) INTRAVENOUS CONTINUOUS PRN
Status: DISCONTINUED | OUTPATIENT
Start: 2025-06-24 | End: 2025-06-24

## 2025-06-24 RX ADMIN — METHOCARBAMOL 500 MG: 500 TABLET ORAL at 08:06

## 2025-06-24 RX ADMIN — MORPHINE SULFATE 4 MG: 4 INJECTION, SOLUTION INTRAMUSCULAR; INTRAVENOUS at 05:06

## 2025-06-24 RX ADMIN — FUROSEMIDE 40 MG: 10 INJECTION, SOLUTION INTRAVENOUS at 08:06

## 2025-06-24 RX ADMIN — GABAPENTIN 600 MG: 300 CAPSULE ORAL at 08:06

## 2025-06-24 RX ADMIN — METOPROLOL TARTRATE 25 MG: 25 TABLET, FILM COATED ORAL at 08:06

## 2025-06-24 RX ADMIN — PROPOFOL 50 MG: 10 INJECTION, EMULSION INTRAVENOUS at 02:06

## 2025-06-24 RX ADMIN — ALBUTEROL SULFATE 2.5 MG: 2.5 SOLUTION RESPIRATORY (INHALATION) at 04:06

## 2025-06-24 RX ADMIN — BUPROPION HYDROCHLORIDE 100 MG: 100 TABLET, FILM COATED, EXTENDED RELEASE ORAL at 08:06

## 2025-06-24 RX ADMIN — METHOCARBAMOL 500 MG: 500 TABLET ORAL at 04:06

## 2025-06-24 RX ADMIN — PANTOPRAZOLE SODIUM 40 MG: 40 INJECTION, POWDER, FOR SOLUTION INTRAVENOUS at 08:06

## 2025-06-24 RX ADMIN — PROPOFOL 100 MG: 10 INJECTION, EMULSION INTRAVENOUS at 02:06

## 2025-06-24 RX ADMIN — PROPOFOL 150 MCG/KG/MIN: 10 INJECTION, EMULSION INTRAVENOUS at 02:06

## 2025-06-24 RX ADMIN — HYDROCODONE BITARTRATE AND ACETAMINOPHEN 1 TABLET: 5; 325 TABLET ORAL at 04:06

## 2025-06-24 RX ADMIN — PROPOFOL 75 MCG/KG/MIN: 10 INJECTION, EMULSION INTRAVENOUS at 02:06

## 2025-06-24 RX ADMIN — LEVOTHYROXINE SODIUM 125 MCG: 0.12 TABLET ORAL at 08:06

## 2025-06-24 RX ADMIN — SODIUM CHLORIDE: 9 INJECTION, SOLUTION INTRAVENOUS at 02:06

## 2025-06-24 RX ADMIN — HYDROCORTISONE ACETATE 25 MG: 25 SUPPOSITORY RECTAL at 08:06

## 2025-06-24 RX ADMIN — PROPOFOL 40 MG: 10 INJECTION, EMULSION INTRAVENOUS at 02:06

## 2025-06-24 RX ADMIN — KETAMINE HYDROCHLORIDE 10 MG: 100 INJECTION INTRAMUSCULAR; INTRAVENOUS at 02:06

## 2025-06-24 RX ADMIN — LIDOCAINE HYDROCHLORIDE 50 MG: 10 INJECTION, SOLUTION EPIDURAL; INFILTRATION; INTRACAUDAL; PERINEURAL at 02:06

## 2025-06-24 RX ADMIN — KETAMINE HYDROCHLORIDE 20 MG: 100 INJECTION INTRAMUSCULAR; INTRAVENOUS at 02:06

## 2025-06-24 RX ADMIN — SODIUM SULFATE, POTASSIUM SULFATE, MAGNESIUM SULFATE 177 ML: 17.5; 3.13; 1.6 SOLUTION, CONCENTRATE ORAL at 04:06

## 2025-06-24 NOTE — PROGRESS NOTES
Patient tolerated the prep overnight.  It was mildly difficult for.  She is going liquid but a little colored.  She admits to no bleeding overnight    Hemoglobin yesterday 9.6 today 10.5.  Abdominal exam is benign chest is clear    Plan for colonoscopy to evaluate for melena hematochezia recs to follow

## 2025-06-24 NOTE — ANESTHESIA POSTPROCEDURE EVALUATION
120/75Anesthesia Post Evaluation    Patient: Azalea Marin    Procedure(s) Performed: Procedure(s) (LRB):  COLON (N/A)  COLONOSCOPY, WITH POLYPECTOMY USING SNARE  COLONOSCOPY, WITH 1 OR MORE BIOPSIES    Final Anesthesia Type: MAC      Patient location during evaluation: PACU  Patient participation: Yes- Able to Participate  Level of consciousness: awake and alert  Post-procedure vital signs: reviewed and stable  Pain management: adequate  Airway patency: patent    PONV status at discharge: No PONV  Anesthetic complications: no      Cardiovascular status: blood pressure returned to baseline  Respiratory status: unassisted  Hydration status: euvolemic  Follow-up not needed.              Vitals Value Taken Time   /65 06/24/25 14:55   Temp  06/24/25 14:55   Pulse 80 06/24/25 14:55   Resp 16 06/24/25 14:55   SpO2 97 06/24/25 14:55         No case tracking events are documented in the log.      Pain/Paul Score: Pain Rating Prior to Med Admin: 8 (6/24/2025  5:03 AM)  Pain Rating Post Med Admin: 0 (6/24/2025  5:33 AM)

## 2025-06-24 NOTE — TRANSFER OF CARE
"Anesthesia Transfer of Care Note    Patient: Azalea Marin    Procedure(s) Performed: Procedure(s) (LRB):  COLON (N/A)  COLONOSCOPY, WITH POLYPECTOMY USING SNARE  COLONOSCOPY, WITH 1 OR MORE BIOPSIES    Patient location: GI    Anesthesia Type: MAC    Transport from OR: Transported from OR on room air with adequate spontaneous ventilation    Post pain: adequate analgesia    Post assessment: no apparent anesthetic complications    Post vital signs: stable    Level of consciousness: awake    Nausea/Vomiting: no nausea/vomiting    Complications: none    Transfer of care protocol was followed    Last vitals: Visit Vitals  /84   Pulse 77   Temp 36.6 °C (97.9 °F)   Resp 16   Ht 5' 6" (1.676 m)   Wt 122.7 kg (270 lb 8.1 oz)   SpO2 96%   BMI 43.66 kg/m²     "
(2) cough or sneeze

## 2025-06-24 NOTE — ANESTHESIA PREPROCEDURE EVALUATION
06/24/2025  Azalea Marin is a 65 y.o., female , who presents for the following:    Date/Time: 01/19/23 1130   Scheduled providers: Ronny Leyva MD   Procedure: EGD   Diagnosis:        Paroxysmal atrial fibrillation [I48.0]       Preop testing [Z01.818]   Location: [facility]         Pre-op Assessment    I have reviewed the Patient Summary Reports.     I have reviewed the Nursing Notes. I have reviewed the NPO Status.   I have reviewed the Medications.     Review of Systems  Anesthesia Hx:  No problems with previous Anesthesia             Denies Family Hx of Anesthesia complications.    Denies Personal Hx of Anesthesia complications.                    Social:  Former Smoker       Cardiovascular:     Hypertension       CHF     RODRIGUEZ                              Pulmonary:   COPD   Shortness of breath  Sleep Apnea Home O2               Hepatic/GI:     GERD                Psych:    depression                Physical Exam  General: Alert and Oriented    Airway:  Mouth Opening: Normal  TM Distance: Normal  Tongue: Normal  Neck ROM: Normal ROM    Chest/Lungs:  Normal Respiratory Rate    Heart:  Rate: Normal  Rhythm: Regular Rhythm          TT E (12/14/23):  Summary    Limited echo done post-watchman.  The estimated ejection fraction is 60-65%.  No significant pericardial effusion or tamponade physiology.        Anesthesia Plan  Type of Anesthesia, risks & benefits discussed:    Anesthesia Type: Gen Natural Airway  Intra-op Monitoring Plan: Standard ASA Monitors  Post Op Pain Control Plan: IV/PO Opioids PRN  Induction:  IV  Airway Plan: Direct  Informed Consent: Informed consent signed with the Patient and all parties understand the risks and agree with anesthesia plan.  All questions answered. Patient consented to blood products? No  ASA Score: 4  Day of Surgery Review of  History & Physical: H&P Update referred to the surgeon/provider.  Anesthesia Plan Notes: Nasal cannula vs facemask supplemental oxygenation   For patients with DESIRAE/obesity, may consider SuperNoval Nasal CPAP      Ready For Surgery From Anesthesia Perspective.     .    Lab Results   Component Value Date    WBC 3.79 (L) 06/24/2025    HGB 10.5 (L) 06/24/2025    HCT 32.4 (L) 06/24/2025    MCV 95.9 (H) 06/24/2025     06/24/2025

## 2025-06-24 NOTE — PT/OT/SLP PROGRESS
Physical Therapy Treatment    Patient Name:  Azalea Marin   MRN:  [id number]    Several attempts to see patient today. Patient unavailable each time. PT to f/u tomorrow.

## 2025-06-24 NOTE — PROGRESS NOTES
[facility] Hospital Medicine Progress Note        Chief Complaint: Inpatient Follow-up for     HPI:   65-year-old female with a past medical history of congestive heart failure, atrial fibrillation, COPD, GERD, HLD, HTN presented to the ED with shortness of breath and worsening lower extremity swelling bilaterally as well as muscle spasms. Patient reports associated frequent falling but has not fallen since being discharged from [facility] after being seen on 06/10/2025. [place] [facility] [place], lightheadedness, problems with balance and was treated for hypokalemia and hyponatremia. She was then discharged and was taken off of her Lasix by the Nephrology team there due to concerns with her kidneys. She then developed worsening shortness of breath and lower extremity swelling over the past week and although she restarted her Lasix on her own her symptoms did not improve. She also reports that the associated muscle spasms are primarily around her rectum and that she has never had these before. She also reports some associated BRBPR and difficulty having bowel movements.   Interval Hx:   6/21:  started her on robaxin and anusol suppositories.     6/22:  clear diet.  GI  started her on clear diet with hopeful plan for colonscopy tomor.  Spasm much better..  Sister and daughter at the bedside reports that she has been having increasing falls in which her legs just gives out.Her pain has worsened since her fall 1 week ago.   Pain better today but still too painful for walking   6/23:  clear diet.  Spasms much better.  Plan for colonoscopy tomor.    6/24:  colonscopy with hemorrhoids and polyp s/p snare.  Some pain today.     Case was discussed with patient's nurse and  on the floor.    Objective/physical exam:  General: In no acute distress, afebrile  Chest: Clear to auscultation bilaterally,  intermittent wheeze anteriorly, 2 L NC   Heart   RRR  Abdomen: Soft, nontender, BS +  MSK: Warm, + bilateral edema. With brawny induration. Decreased edema   Neurologic: Alert and oriented x4    VITAL SIGNS: 24 HRS MIN & MAX LAST   Temp  Min: 97.3 °F (36.3 °C)  Max: 98.5 °F (36.9 °C) 98.1 °F (36.7 °C)   BP  Min: 99/56  Max: 153/87  (pt off the unit)   Pulse  Min: 68  Max: 87  85   Resp  Min: 16  Max: 22 20   SpO2  Min: 90 %  Max: 97 % 95 %     I have reviewed the following labs:  Recent Labs   Lab 06/22/25  0442 06/23/25  0419 06/24/25  0412   WBC 3.81* 3.91* 3.79*   RBC 3.12* 3.08* 3.38*   HGB 9.7* 9.6* 10.5*   HCT 29.7* 29.4* 32.4*   MCV 95.2* 95.5* 95.9*   MCH 31.1* 31.2* 31.1*   MCHC 32.7* 32.7* 32.4*   RDW 18.8* 18.6* 18.4*    136 169   MPV 9.0 8.9 9.2     Recent Labs   Lab 06/20/25  1014 06/21/25  0351 06/22/25  0442 06/23/25  0419 06/24/25 0412   * 132* 132* 131* 138   K 4.7 3.8 4.2 3.6 3.6   CL 94* 96* 97* 95* 96*   CO2 28 29 27 31 31   BUN 8.8* 7.5* 7.1* 8.8* 9.4*   CREATININE 0.73 0.61 0.63 0.62 0.58   * 97 103 100 94   CALCIUM 9.5 8.8 8.4 8.7 9.0   MG 1.90  --   --   --   --    ALBUMIN 3.5 2.9* 3.0* 2.8*  --    PROT 7.6 6.3 6.1 6.4  --    ALKPHOS 245* 187* 190* 191*  --    ALT 20 16 13 12  --    AST 23 15 16 13  --    BILITOT 0.9 0.8 0.7 0.7  --      Microbiology Results (last 7 days)       ** No results found for the last 168 hours. **             See below for Radiology    Assessment/Plan:  HF pEF with LE edema   Soft BP   Hyponatremia, chronic   Leg discomfort due to swelling   Rectum spasm and low back pain   COPD   A fib   BRB suspected from rectum  Diabetes with neuropathy     - Lasix iv 40q d as tolerated.   Continue low dose metoprolol  for HR control , tele .   I/O.  Legs appears slight ly improved, however it really has appearance of chronic disease  - GI did colonscopy.  Anusol suppositories 6/21.      - robaxin tid  started 6/21.  [place] PRN   - MRI reviewed and luumbar degenerative disc and spondylosis noted.  Neuro  surgery indicates no surgical intervention.     - neurontin resumed 600 BID   - PT evaluation pending       VTE prophylaxis:  lovenox     Patient condition:  Stable    Anticipated discharge and Disposition:    TBD.  PT eval pending.       All diagnosis and differential diagnosis have been reviewed; assessment and plan has been documented; I have personally reviewed the labs and test results that are presently available; I have reviewed the patients medication list; I have reviewed the consulting providers response and recommendations. I have reviewed or attempted to review medical records based upon their availability    All of the patient's questions have been  addressed and answered. Patient's is agreeable to the above stated plan. I will continue to monitor closely and make adjustments to medical management as needed.    Portions of this note dictated using EMR integrated voice recognition software, and may be subject to voice recognition errors not corrected at proofreading. Please contact writer for clarification if needed.   _____________________________________________________________________    Malnutrition Status:  Nutrition consulted. Most recent weight and BMI monitored-     Measurements:  Wt Readings from Last 1 Encounters:   06/23/25 122.7 kg (270 lb 8.1 oz)   Body mass index is 43.66 kg/m².    Patient has been screened and assessed by RD.    Malnutrition Type:  Context:    Level: other (see comments) (Does not meet criteria)    Malnutrition Characteristic Summary:  Weight Loss (Malnutrition): other (see comments) (Does not meet criteria)  Energy Intake (Malnutrition): other (see comments) (Does not meet criteria)    Interventions/Recommendations (treatment strategy):        Scheduled Med:   albuterol  2.5 mg Nebulization Q8H    buPROPion  100 mg Oral BID    [START ON 6/25/2025] furosemide  80 mg Oral Daily    gabapentin  600 mg Oral BID    hydrocortisone  25 mg Rectal BID    ketamine in 0.9 % sod  chloride        levothyroxine  125 mcg Oral Daily    methocarbamoL  500 mg Oral TID    metoprolol tartrate  25 mg Oral BID    pantoprazole  40 mg Intravenous Daily    polyethylene glycol  17 g Oral BID      Continuous Infusions:     PRN Meds:    Current Facility-Administered Medications:     dextrose 50%, 12.5 g, Intravenous, PRN    dextrose 50%, 25 g, Intravenous, PRN    glucagon (human recombinant), 1 mg, Intramuscular, PRN    glucose, 16 g, Oral, PRN    glucose, 24 g, Oral, PRN    HYDROcodone-acetaminophen, 1 tablet, Oral, Q4H PRN    ketamine in 0.9 % sod chloride, , ,     melatonin, 6 mg, Oral, Nightly PRN    morphine, 4 mg, Intravenous, Q4H PRN    naloxone, 0.02 mg, Intravenous, PRN    sodium chloride 0.9%, 10 mL, Intravenous, Q12H PRN     Radiology:  I have personally reviewed the following imaging and agree with the radiologist.     Echo    Left Ventricle: The left ventricle is normal in size. Normal wall   thickness. There is normal systolic function with a visually estimated   ejection fraction of 55 - 60%. Unable to assess diastolic function due to   atrial fibrillation.    Right Ventricle: Right ventricle was not well visualized due to poor   acoustic window.    Left Atrium: The left atrium is severely dilated    Right Atrium: The right atrium is dilated.    Aortic Valve: There is aortic valve sclerosis.    Mitral Valve: There is mitral annular calcification. There is moderate   regurgitation.    Tricuspid Valve: There is mild to moderate regurgitation.    Pulmonary Artery: There is mild pulmonary hypertension. The estimated   pulmonary artery systolic pressure is 44 mmHg.    IVC/SVC: Intermediate venous pressure at 8 mmHg.    Pericardium: There is no pericardial effusion.      Ashlee Stiles MD  Department of Hospital Medicine   [facility]   06/24/2025

## 2025-06-24 NOTE — PROVATION PATIENT INSTRUCTIONS
Discharge Summary/Instructions after an Endoscopic Procedure  Patient Name: Azalea Marin  Patient MRN: [id number]  Patient YOB: 1960 Tuesday, June 24, 2025  Vance Davis MD  Dear patient,  As a result of recent federal legislation (The Federal Cures Act), you may   receive lab or pathology results from your procedure in your [facility]   account before your physician is able to contact you. Your physician or   their representative will relay the results to you with their   recommendations at their soonest availability.  Thank you,  RESTRICTIONS:  During your procedure today, you received medications for sedation.  These   medications may affect your judgment, balance and coordination.  Therefore,   for 24 hours, you have the following restrictions:   - DO NOT drive a car, operate machinery, make legal/financial decisions,   sign important papers or drink alcohol.    ACTIVITY:  Today: no heavy lifting, straining or running due to procedural   sedation/anesthesia.  The following day: return to full activity including work.  DIET:  Eat and drink normally unless instructed otherwise.     TREATMENT FOR COMMON SIDE EFFECTS:  - Mild abdominal pain, nausea, belching, bloating or excessive gas:  rest,   eat lightly and use a heating pad.  - Sore Throat: treat with throat lozenges and/or gargle with warm salt   water.  - Because air was used during the procedure, expelling large amounts of air   from your rectum or belching is normal.  - If a bowel prep was taken, you may not have a bowel movement for 1-3 days.    This is normal.  SYMPTOMS TO WATCH FOR AND REPORT TO YOUR PHYSICIAN:  1. Abdominal pain or bloating, other than gas cramps.  2. Chest pain.  3. Back pain.  4. Signs of infection such as: chills or fever occurring within 24 hours   after the procedure.  5. Rectal bleeding, which would show as bright red, maroon, or black stools.   (A tablespoon of blood from the rectum is not serious, especially  if   hemorrhoids are present.)  6. Vomiting.  7. Weakness or dizziness.  GO DIRECTLY TO THE NEAREST EMERGENCY ROOM IF YOU HAVE ANY OF THE FOLLOWING:      Difficulty breathing              Chills and/or fever over 101 F   Persistent vomiting and/or vomiting blood   Severe abdominal pain   Severe chest pain   Black, tarry stools   Bleeding- more than one tablespoon   Any other symptom or condition that you feel may need urgent attention  Your doctor recommends these additional instructions:  If any biopsies were taken, your doctors clinic will contact you in 1 to 2   weeks with any results.  Recommendations:  Follow up pathology  symptomatic treatment of hemorrhoids  Based on the polyps and the prep recommend a three year recall  High fiber diet  Ok to discharge from GI and follow up outpatient in 2-4 weeks with cbc with   her primary GI  - Return patient to [facility] for [facility] care.   - OK to advance diet  - Repeat colonoscopy for surveillance.  Impressions:  - Hemorrhoids found on perianal exam. Likely the source of minimal rectal   bleeding  - Two 4 to 8 mm polyps in the ascending colon, removed with a cold snare.    Resected and retrieved.   - Diverticulosis in the sigmoid colon.   - Congested and inflamed mucosa in the rectum.  Biopsied.   - The examined portion of the ileum was normal.  For questions, problems or results please call your physician - Vance Davis MD at Work:  [phone].  [facility] ED at [phone]  IF A COMPLICATION OR EMERGENCY SITUATION ARISES AND YOU ARE UNABLE TO REACH   YOUR PHYSICIAN - GO DIRECTLY TO THE EMERGENCY ROOM.  MD Vance Mccann MD  6/24/2025 3:01:54 PM  This report has been verified and signed electronically.  Dear patient,  As a result of recent federal legislation (The Federal Cures Act), you may   receive lab or pathology results from your procedure in your [facility]   account before your physician is able to  contact you. Your physician or   their representative will relay the results to you with their   recommendations at their soonest availability.  Thank you,  PROVATION

## 2025-06-24 NOTE — BRIEF OP NOTE
Procedure colonoscopy cold snare and biopsy    Indication hematochezia history of polyp    Postop 2 small polyps a mild irritation rectum from prep artifact    Internal hemorrhoids likely the source of bleeding    Repeat examined 3 years due to quality of the prep okay to DC home    No further GI recommendations topical treatment for symptomatic hemorrhoids

## 2025-06-25 VITALS
RESPIRATION RATE: 17 BRPM | DIASTOLIC BLOOD PRESSURE: 66 MMHG | SYSTOLIC BLOOD PRESSURE: 106 MMHG | HEART RATE: 77 BPM | BODY MASS INDEX: 43.47 KG/M2 | WEIGHT: 270.5 LBS | HEIGHT: 66 IN | TEMPERATURE: 98 F | OXYGEN SATURATION: 93 %

## 2025-06-25 LAB — PSYCHE PATHOLOGY RESULT: NORMAL

## 2025-06-25 PROCEDURE — 94760 N-INVAS EAR/PLS OXIMETRY 1: CPT

## 2025-06-25 PROCEDURE — 25000003 PHARM REV CODE 250

## 2025-06-25 PROCEDURE — 99900031 HC PATIENT EDUCATION (STAT)

## 2025-06-25 PROCEDURE — 94640 AIRWAY INHALATION TREATMENT: CPT

## 2025-06-25 PROCEDURE — 97162 PT EVAL MOD COMPLEX 30 MIN: CPT

## 2025-06-25 PROCEDURE — 99900035 HC TECH TIME PER 15 MIN (STAT)

## 2025-06-25 PROCEDURE — 27000221 HC OXYGEN, UP TO 24 HOURS

## 2025-06-25 PROCEDURE — 94761 N-INVAS EAR/PLS OXIMETRY MLT: CPT

## 2025-06-25 PROCEDURE — 63600175 PHARM REV CODE 636 W HCPCS: Performed by: HOSPITALIST

## 2025-06-25 PROCEDURE — 25000242 PHARM REV CODE 250 ALT 637 W/ HCPCS: Performed by: HOSPITALIST

## 2025-06-25 PROCEDURE — 25000003 PHARM REV CODE 250: Performed by: HOSPITALIST

## 2025-06-25 RX ORDER — HYDROCODONE BITARTRATE AND ACETAMINOPHEN 5; 325 MG/1; MG/1
1 TABLET ORAL EVERY 8 HOURS PRN
Refills: 0 | Status: DISCONTINUED | OUTPATIENT
Start: 2025-06-25 | End: 2025-06-25 | Stop reason: HOSPADM

## 2025-06-25 RX ORDER — POTASSIUM CHLORIDE 1500 MG/1
20 TABLET, EXTENDED RELEASE ORAL DAILY
Qty: 30 TABLET | Refills: 1 | Status: SHIPPED | OUTPATIENT
Start: 2025-06-25

## 2025-06-25 RX ORDER — METOPROLOL TARTRATE 25 MG/1
25 TABLET, FILM COATED ORAL 2 TIMES DAILY
Qty: 180 TABLET | Refills: 3 | Status: SHIPPED | OUTPATIENT
Start: 2025-06-25 | End: 2026-06-25

## 2025-06-25 RX ORDER — FUROSEMIDE 80 MG/1
80 TABLET ORAL DAILY
Qty: 30 TABLET | Refills: 11 | Status: SHIPPED | OUTPATIENT
Start: 2025-06-26 | End: 2026-06-26

## 2025-06-25 RX ORDER — GABAPENTIN 300 MG/1
600 CAPSULE ORAL 2 TIMES DAILY
Qty: 120 CAPSULE | Refills: 11 | Status: SHIPPED | OUTPATIENT
Start: 2025-06-25 | End: 2026-06-25

## 2025-06-25 RX ORDER — METHOCARBAMOL 500 MG/1
500 TABLET, FILM COATED ORAL 3 TIMES DAILY
Qty: 21 TABLET | Refills: 0 | Status: SHIPPED | OUTPATIENT
Start: 2025-06-25 | End: 2025-07-02

## 2025-06-25 RX ORDER — ASPIRIN 325 MG
325 TABLET ORAL DAILY
Start: 2025-06-25 | End: 2025-08-24

## 2025-06-25 RX ORDER — HYDROCORTISONE ACETATE 25 MG/1
25 SUPPOSITORY RECTAL 2 TIMES DAILY
Qty: 20 SUPPOSITORY | Refills: 0 | Status: SHIPPED | OUTPATIENT
Start: 2025-06-25 | End: 2025-07-05

## 2025-06-25 RX ORDER — HYDROCODONE BITARTRATE AND ACETAMINOPHEN 5; 325 MG/1; MG/1
1 TABLET ORAL EVERY 8 HOURS PRN
Qty: 21 TABLET | Refills: 0 | Status: SHIPPED | OUTPATIENT
Start: 2025-06-25 | End: 2025-07-02

## 2025-06-25 RX ADMIN — ALBUTEROL SULFATE 2.5 MG: 2.5 SOLUTION RESPIRATORY (INHALATION) at 09:06

## 2025-06-25 RX ADMIN — HYDROCODONE BITARTRATE AND ACETAMINOPHEN 1 TABLET: 5; 325 TABLET ORAL at 12:06

## 2025-06-25 RX ADMIN — METHOCARBAMOL 500 MG: 500 TABLET ORAL at 02:06

## 2025-06-25 RX ADMIN — LEVOTHYROXINE SODIUM 125 MCG: 0.12 TABLET ORAL at 08:06

## 2025-06-25 RX ADMIN — METOPROLOL TARTRATE 25 MG: 25 TABLET, FILM COATED ORAL at 08:06

## 2025-06-25 RX ADMIN — PANTOPRAZOLE SODIUM 40 MG: 40 INJECTION, POWDER, FOR SOLUTION INTRAVENOUS at 08:06

## 2025-06-25 RX ADMIN — FUROSEMIDE 80 MG: 40 TABLET ORAL at 08:06

## 2025-06-25 RX ADMIN — BUPROPION HYDROCHLORIDE 100 MG: 100 TABLET, FILM COATED, EXTENDED RELEASE ORAL at 08:06

## 2025-06-25 RX ADMIN — HYDROCORTISONE ACETATE 25 MG: 25 SUPPOSITORY RECTAL at 08:06

## 2025-06-25 RX ADMIN — ALBUTEROL SULFATE 2.5 MG: 2.5 SOLUTION RESPIRATORY (INHALATION) at 12:06

## 2025-06-25 RX ADMIN — HYDROCODONE BITARTRATE AND ACETAMINOPHEN 1 TABLET: 5; 325 TABLET ORAL at 09:06

## 2025-06-25 RX ADMIN — METHOCARBAMOL 500 MG: 500 TABLET ORAL at 08:06

## 2025-06-25 RX ADMIN — GABAPENTIN 600 MG: 300 CAPSULE ORAL at 08:06

## 2025-06-25 NOTE — DISCHARGE SUMMARY
[facility] Medicine Discharge Summary    Admit Date: 6/20/2025  Discharge Date and Time: 6/25/20251:11 PM  Admitting Physician:  Team  Discharging Physician: Ashlee Stiles DO.  Primary Care Physician: Abbi Jacobsen MD  Consults:  GI, neurosurgery    Discharge Diagnoses:    HF pEF with LE edema   Soft BP   Hyponatremia, chronic   Leg discomfort due to swelling   Rectum spasm and low back pain   COPD   A fib with Watchman's  BRB suspected from rectum  Diabetes with neuropathy   Internal hemrrhoids    Hospital Course:      She was started on Lasix iv 40q d  and edema improved. She was also continued on a lower dose metoprolol  for HR control , tele .   I/O.  Legs appears slight ly improved, however it really has appearance of chronic disease.     GI did colonscopy and noted a couple polyps and internal hemorrhoids.   Anusol suppositories 6/21 for 10 days      She has some back spasms.  She was started on norco and  robaxin tid  started 6/21.   MRI  back done and reviewed and luumbar degenerative disc and spondylosis noted.  Neuro surgery indicates no surgical intervention.       She does have neuropathy and resumed on her neurontin resumed 600 BID     PT evaluated and day of discharge and recommend home with home health.      Recommend that she follow up with her PCP to review medications and pain management.     Pt was seen and examined on the day of discharge  Vitals:  VITAL SIGNS: 24 HRS MIN & MAX LAST   Temp  Min: 97.5 °F (36.4 °C)  Max: 98.3 °F (36.8 °C) 98.3 °F (36.8 °C)   BP  Min: 99/56  Max: 139/83 106/66   Pulse  Min: 62  Max: 86  77   Resp  Min: 16  Max: 22 17   SpO2  Min: 90 %  Max: 97 % (!) 93 %       Physical Exam:  General: In no acute distress, afebrile  Chest: Clear to auscultation bilaterally,  intermittent wheeze anteriorly, 2 L NC   Heart  RRR  Abdomen: Soft, nontender, BS +  MSK: Warm, + bilateral edema. With brawny induration. Decreased edema    Neurologic: Alert and oriented x4      Procedures Performed: No admission procedures for hospital encounter.     Significant Diagnostic Studies: See Full reports for all details    Recent Labs   Lab 06/22/25  0442 06/23/25 0419 06/24/25 0412   WBC 3.81* 3.91* 3.79*   RBC 3.12* 3.08* 3.38*   HGB 9.7* 9.6* 10.5*   HCT 29.7* 29.4* 32.4*   MCV 95.2* 95.5* 95.9*   MCH 31.1* 31.2* 31.1*   MCHC 32.7* 32.7* 32.4*   RDW 18.8* 18.6* 18.4*    136 169   MPV 9.0 8.9 9.2       Recent Labs   Lab 06/20/25  1014 06/21/25  0351 06/22/25 0442 06/23/25 0419 06/24/25 0412   * 132* 132* 131* 138   K 4.7 3.8 4.2 3.6 3.6   CL 94* 96* 97* 95* 96*   CO2 28 29 27 31 31   BUN 8.8* 7.5* 7.1* 8.8* 9.4*   CREATININE 0.73 0.61 0.63 0.62 0.58   * 97 103 100 94   CALCIUM 9.5 8.8 8.4 8.7 9.0   MG 1.90  --   --   --   --    ALBUMIN 3.5 2.9* 3.0* 2.8*  --    PROT 7.6 6.3 6.1 6.4  --    ALKPHOS 245* 187* 190* 191*  --    ALT 20 16 13 12  --    AST 23 15 16 13  --    BILITOT 0.9 0.8 0.7 0.7  --         Microbiology Results (last 7 days)       ** No results found for the last 168 hours. **             Echo    Left Ventricle: The left ventricle is normal in size. Normal wall   thickness. There is normal systolic function with a visually estimated   ejection fraction of 55 - 60%. Unable to assess diastolic function due to   atrial fibrillation.    Right Ventricle: Right ventricle was not well visualized due to poor   acoustic window.    Left Atrium: The left atrium is severely dilated    Right Atrium: The right atrium is dilated.    Aortic Valve: There is aortic valve sclerosis.    Mitral Valve: There is mitral annular calcification. There is moderate   regurgitation.    Tricuspid Valve: There is mild to moderate regurgitation.    Pulmonary Artery: There is mild pulmonary hypertension. The estimated   pulmonary artery systolic pressure is 44 mmHg.    IVC/SVC: Intermediate venous pressure at 8 mmHg.    Pericardium: There is no  pericardial effusion.         Medication List        PAUSE taking these medications      clopidogreL 75 mg tablet  Wait to take this until your doctor or other care provider tells you to start again.  Commonly known as: PLAVIX  Take 1 tablet (75 mg total) by mouth once daily.     vitamin D 1000 units Tab  Wait to take this until your doctor or other care provider tells you to start again.  Commonly known as: VITAMIN D3            START taking these medications      aspirin 325 MG tablet  Take 1 tablet (325 mg total) by mouth once daily.  Replaces: aspirin 81 MG EC tablet     gabapentin 300 MG capsule  Commonly known as: NEURONTIN  Take 2 capsules (600 mg total) by mouth 2 (two) times daily.  Replaces: gabapentin 600 MG tablet     HYDROcodone-acetaminophen 5-325 mg per tablet  Commonly known as: NORCO  Take 1 tablet by mouth every 8 (eight) hours as needed for Pain.  Replaces: HYDROcodone-acetaminophen 7.5-325 mg per tablet     hydrocortisone 25 mg suppository  Commonly known as: ANUSOL-HC  Place 1 suppository (25 mg total) rectally 2 (two) times daily. for 10 days     methocarbamoL 500 MG Tab  Commonly known as: Robaxin  Take 1 tablet (500 mg total) by mouth 3 (three) times daily. for 7 days     metoprolol tartrate 25 MG tablet  Commonly known as: LOPRESSOR  Take 1 tablet (25 mg total) by mouth 2 (two) times daily.            CHANGE how you take these medications      furosemide 80 MG tablet  Commonly known as: LASIX  Take 1 tablet (80 mg total) by mouth once daily.  Start taking on: June 26, 2025  What changed:   when to take this  reasons to take this     potassium chloride 20 mEq  Commonly known as: K-TAB  Take 1 tablet (20 mEq total) by mouth once daily. Take when taking Lasix  What changed:   when to take this  additional instructions            CONTINUE taking these medications      albuterol 2.5 mg /3 mL (0.083 %) nebulizer solution  Commonly known as: PROVENTIL     buPROPion 100 MG TBSR 12 hr  tablet  Commonly known as: WELLBUTRIN SR     ferrous sulfate 325 (65 FE) MG EC tablet     levothyroxine 125 MCG tablet  Commonly known as: SYNTHROID     omeprazole 40 MG capsule  Commonly known as: PRILOSEC            STOP taking these medications      aspirin 81 MG EC tablet  Commonly known as: ECOTRIN  Replaced by: aspirin 325 MG tablet     gabapentin 600 MG tablet  Commonly known as: NEURONTIN  Replaced by: gabapentin 300 MG capsule     HYDROcodone-acetaminophen 7.5-325 mg per tablet  Commonly known as: NORCO  Replaced by: HYDROcodone-acetaminophen 5-325 mg per tablet     metoprolol succinate 100 mg Cspx     paroxetine 20 MG tablet  Commonly known as: PAXIL               Where to Get Your Medications        These medications were sent to [id number] - [place] [address] AT [facility] [place] [address] [address] [address]      Phone: [phone]   furosemide 80 MG tablet  gabapentin 300 MG capsule  HYDROcodone-acetaminophen 5-325 mg per tablet  hydrocortisone 25 mg suppository  methocarbamoL 500 MG Tab  metoprolol tartrate 25 MG tablet  potassium chloride 20 mEq       Information about where to get these medications is not yet available    Ask your nurse or doctor about these medications  aspirin 325 MG tablet          Explained in detail to the patient about the discharge plan, medications, and follow-up visits. Pt understands and agrees with the treatment plan  Discharge Disposition: Home or Self Care   Discharged Condition: stable  Diet-   Dietary Orders (From admission, onward)       Start     Ordered    06/24/25 1618  Diet Heart Healthy Standard Tray  Diet effective now        Question:  Tray type:  Answer:  Standard Tray    06/24/25 1617                   Medications Per DC med rec  Activities as tolerated   Follow-up Information       [facility]    Specialties: Home Health Services, Home Therapy Services, Home Living Aide Services  Why: This  is your current home health provider. Please notify them when you are discharged from the hospital.  Contact information:  [address] [facility] [address]  [phone]             Abbi Jacobsen MD Follow up in 1 week(s).    Specialty: Family Medicine  Contact information:  [address] [facility] [address]  [phone]                           For further questions contact hospitalist office    Discharge time 33 minutes    For worsening symptoms, chest pain, shortness of breath, increased abdominal pain, high grade fever, stroke or stroke like symptoms, immediately go to the nearest Emergency Room or call 911 as soon as possible.      Ashlee Eisenberg M.D, on 6/25/2025. at 1:11 PM.

## 2025-06-25 NOTE — DISCHARGE INSTRUCTIONS
Our goal at [facility] is to always give you outstanding care and exceptional service. You may receive a survey from [employer] by mail, text or e-mail in the next 24-48 hours asking about the care you received with us. The survey should only take 5-10 minutes to complete and is very important to us.     Your feedback provides us with a way to recognize our staff who work tirelessly to provide the best care! Also, your responses help us learn how to improve when your experience was below our aspiration of excellence. We are always looking for ways to improve your stay. We WILL use your feedback to continue making improvements to help us provide the highest quality care. We keep your personal information and feedback confidential. We appreciate your time completing this survey and can't wait to hear from you!!!    We look forward to your continued care with us! Thanks so much for choosing [facility] for your healthcare needs!

## 2025-06-25 NOTE — PLAN OF CARE
06/25/25 1459   Final Note   Assessment Type Final Discharge Note   Anticipated Discharge Disposition Home-Health   Post-Acute Status   Post-Acute Authorization Home Health     Discharge documentation sent to [facility] via [employer]. Angela delivered RW to patients room.

## 2025-06-25 NOTE — PT/OT/SLP EVAL
Physical Therapy Evaluation    Patient Name:  Azalea Marin   MRN:  [id number]    Recommendations:     Discharge therapy intensity: Low Intensity Therapy   Discharge Equipment Recommendations: walker, rolling   Barriers to discharge: Ongoing medical needs    Assessment:     Azalea Marin is a 65 y.o. female admitted with a medical diagnosis of heart failure, LE edema, hyponatremia, falls, LBP, s/p colonoscopy.  She presents with the following impairments/functional limitations: weakness, impaired endurance, impaired balance, impaired functional mobility, gait instability, decreased lower extremity function. Pt tolerated session well, ambulatory household distances with RW CGA-SBA. Pt ambulatory on 2L NC, 02 stable throughout. Recommending RW for d/c to improve safety with mobilization.     Rehab Prognosis: Good; patient would benefit from acute skilled PT services to address these deficits and reach maximum level of function.    Recent Surgery: Procedure(s) (LRB):  COLON (N/A)  COLONOSCOPY, WITH POLYPECTOMY USING SNARE  COLONOSCOPY, WITH 1 OR MORE BIOPSIES 1 Day Post-Op    Plan:     During this hospitalization, patient would benefit from acute PT services 5 x/week to address the identified rehab impairments via gait training, therapeutic activities, therapeutic exercises and progress toward the following goals:    Plan of Care Expires:  08/02/25    Subjective     Chief Complaint: none noted  Patient/Family Comments/goals: to go home  Pain/Comfort:  Pain Rating 1: 0/10    Patients cultural, spiritual, Church conflicts given the current situation:      Living Environment:  Pt lives with significant other in a H with 4 steps to enter and unilateral railing.   Prior to admission, patients level of function was I.  Equipment used at home:  (rollator, standard walker, shower chair).  DME owned (not currently used): none.  Upon discharge, patient will have assistance from significant other when he is home  (works as a ) has family support when he is away.    Objective:     Communicated with nsg prior to session.  Patient found up in chair with peripheral IV, pulse ox (continuous), telemetry, oxygen  upon PT entry to room.    General Precautions: Standard,    Orthopedic Precautions:N/A   Braces: N/A  Respiratory Status: Nasal cannula, flow 2 L/min 95%    Exams:  RLE Strength: WFL  LLE Strength: WFL  Skin integrity: discoloration to BLE distal to knee      Functional Mobility:  Transfers:     Sit to Stand:  stand by assistance with rolling walker  Gait: Pt ambulated x 120' with RW CGA-SBA. No LOB or safety concerns  Balance: fair static standing balance      AM-PAC 6 CLICK MOBILITY  Total Score:22     Co-Treatment: No    Treatment & Education:    Patient and daughter/s were provided with verbal education education regarding PT role/goals/POC, fall prevention, safety awareness, and discharge/DME recommendations.  Understanding was verbalized.     Patient left up in chair with all lines intact, call button in reach, and daughter present.    DME Justification:   Azalea's mobility limitation cannot be sufficiently resolved by the use of a cane. Her functional mobility deficit can be sufficiently resolved with the use of a Rolling Walker. Patient's mobility limitation significantly impairs their ability to participate in one of more activities of daily living.  The use of a RW will significantly improve the patient's ability to participate in MRADLS and the patient will use it on regular basis in the home.    GOALS:   Multidisciplinary Problems       Physical Therapy Goals       Not on file                    History:     Past Medical History:   Diagnosis Date    Arthritis     Atrial fibrillation     Cellulitis and abscess of leg     CHF (congestive heart failure)     Claustrophobia     COPD (chronic obstructive pulmonary disease)     Depression     GERD (gastroesophageal reflux disease)     Hyperlipidemia      Hypertension     Obesity     Oxygen dependent     2.5L    Sleep apnea     does not use CPAP    Thyroid disease        Past Surgical History:   Procedure Laterality Date    CARDIAC CATHETERIZATION      CLOSURE OF LEFT ATRIAL APPENDAGE USING DEVICE N/A 12/13/2022    Procedure: CLOSURE, LEFT ATRIAL APPENDAGE, USING DEVICE;  Surgeon: Ronny Leyva MD;  Location: [facility] CATH LAB;  Service: Cardiology;  Laterality: N/A;  WATCHMAN W/ INTRA OP CHRIS    COLONOSCOPY N/A 6/24/2025    Procedure: COLON;  Surgeon: Vance Davis MD;  Location: [facility] ENDOSCOPY;  Service: Gastroenterology;  Laterality: N/A;    COLONOSCOPY, WITH 1 OR MORE BIOPSIES  6/24/2025    Procedure: COLONOSCOPY, WITH 1 OR MORE BIOPSIES;  Surgeon: Vance Davis MD;  Location: [facility] ENDOSCOPY;  Service: Gastroenterology;;    COLONOSCOPY, WITH POLYPECTOMY USING SNARE  6/24/2025    Procedure: COLONOSCOPY, WITH POLYPECTOMY USING SNARE;  Surgeon: Vance Davis MD;  Location: [facility] ENDOSCOPY;  Service: Gastroenterology;;    ECHOCARDIOGRAM,TRANSESOPHAGEAL N/A 12/13/2022    Procedure: ECHOCARDIOGRAM,TRANSESOPHAGEAL;  Surgeon: Brandon Diagnostic Provider;  Location: [facility] CATH LAB;  Service: Cardiology;  Laterality: N/A;    HYSTERECTOMY      TRANSESOPHAGEAL ECHOCARDIOGRAPHY         Time Tracking:     PT Received On: 06/25/25  PT Start Time: 0952     PT Stop Time: 1011  PT Total Time (min): 19 min     Billable Minutes: Evaluation 19 06/25/2025

## 2025-06-25 NOTE — PLAN OF CARE
06/25/25 1202   Discharge Reassessment   Assessment Type Discharge Planning Reassessment   Discharge Plan discussed with: Patient;Adult children   Discharge Plan A Home Health   Discharge Plan B Home Health   DME Needed Upon Discharge  walker, rolling   Post-Acute Status   Post-Acute Authorization Home Health     PT recommended home health. Patient is current with [facility]. List of DME providers given for [facility]. Verbal FOC obtained for Angela. Referral sent via [employer].

## 2025-06-26 NOTE — PHYSICIAN QUERY
Due to conflicting clinical picture, please confirm/clarify the acute on chronic respiratory failure.  Other respiratory diagnosis (please specify):  acute on chronic respiratory failure, ruled out

## (undated) DEVICE — CONTAINER SPEC STRL PATH 4OZ

## (undated) DEVICE — NDL NRG TRNSPTAL 71CM

## (undated) DEVICE — SOL IRRI STRL WATER 1000ML

## (undated) DEVICE — NAMIC CONVENIENCE KIT

## (undated) DEVICE — DILATOR VESSELL 8.0-38

## (undated) DEVICE — CABLE EKG DL RBBN 3 LEAD DISP

## (undated) DEVICE — SYRINGE 0.9% NACL 10MIL PREFIL

## (undated) DEVICE — WIRE PGTL FLX SPRL  .025X230CM

## (undated) DEVICE — DILATOR RADIOPAQUE JCD 12.0F

## (undated) DEVICE — ADAPTER DBL MALE LL CLR POLY

## (undated) DEVICE — LINE INJ CONTRAST 500PSI

## (undated) DEVICE — Device

## (undated) DEVICE — SET INTRO PERFRMR SHTH 16FR

## (undated) DEVICE — PAD DEFIB RADIOLUCENT ADULT

## (undated) DEVICE — FLUID DELIVERY SET WITH FILTER

## (undated) DEVICE — KIT SURGICAL COLON .25 1.1OZ

## (undated) DEVICE — LINER MEDI-VAC PPV FLTR 1500CC

## (undated) DEVICE — SYS WATCHMAN FXD CURVE DBL US

## (undated) DEVICE — INTRODUCER SWARTZ SL0 8.5FR

## (undated) DEVICE — CATH IMPULSE 5FR PIGTAIL 125CM

## (undated) DEVICE — FORCEP BX LG CAP 2.8MMX240CM

## (undated) DEVICE — SUT ETHBND XTRA 1 OS-8 30IN

## (undated) DEVICE — TIP SUCTION YANKAUER

## (undated) DEVICE — MANIFOLD 4 PORT

## (undated) DEVICE — TRAP ETRAP POLYP 50 TRAY

## (undated) DEVICE — DILATOR VESSEL 10FR X 20CM

## (undated) DEVICE — BAG LABGUARD BIOHAZARD 6X9IN